# Patient Record
Sex: FEMALE | Race: WHITE | NOT HISPANIC OR LATINO | URBAN - METROPOLITAN AREA
[De-identification: names, ages, dates, MRNs, and addresses within clinical notes are randomized per-mention and may not be internally consistent; named-entity substitution may affect disease eponyms.]

---

## 2023-05-11 ENCOUNTER — OFFICE VISIT (OUTPATIENT)
Dept: OBGYN CLINIC | Facility: CLINIC | Age: 28
End: 2023-05-11

## 2023-05-11 VITALS — DIASTOLIC BLOOD PRESSURE: 60 MMHG | SYSTOLIC BLOOD PRESSURE: 120 MMHG | WEIGHT: 160.6 LBS

## 2023-05-11 DIAGNOSIS — Z3A.20 20 WEEKS GESTATION OF PREGNANCY: ICD-10-CM

## 2023-05-11 DIAGNOSIS — Z34.82 PRENATAL CARE, SUBSEQUENT PREGNANCY, SECOND TRIMESTER: Primary | ICD-10-CM

## 2023-05-11 NOTE — ASSESSMENT & PLAN NOTE
Nan Carmona is a transfer of care from [employer]  Moved to [place] (with family), but ultimately plans to locate to the [facility]  Records scanned  EDC based on first trimester US, changed from LMP  PN panel complete  Pap and cultures up to date  AFP low risk  Declined NIPT and carrier screening  Has not yet had level II US - referral provided  Declined flu/COVID vaccines  A positive - will need Abo/RH with Vani Magi, but can wait until third trimester labs  Starting to feel some flutters  No bleeding/LOF/cramping  Works as

## 2023-05-12 ENCOUNTER — INITIAL PRENATAL (OUTPATIENT)
Dept: OBGYN CLINIC | Facility: CLINIC | Age: 28
End: 2023-05-12

## 2023-05-12 VITALS — BODY MASS INDEX: 28.45 KG/M2 | HEIGHT: 63 IN

## 2023-05-12 DIAGNOSIS — Z34.02 ENCOUNTER FOR SUPERVISION OF NORMAL FIRST PREGNANCY IN SECOND TRIMESTER: Primary | ICD-10-CM

## 2023-05-12 RX ORDER — MAGNESIUM 200 MG
TABLET ORAL
COMMUNITY

## 2023-05-12 RX ORDER — DIPHENHYDRAMINE HCL 25 MG
25 TABLET ORAL EVERY 6 HOURS PRN
COMMUNITY

## 2023-05-12 RX ORDER — LORATADINE 10 MG/1
10 TABLET ORAL DAILY
COMMUNITY

## 2023-05-12 NOTE — PATIENT INSTRUCTIONS
Congratulations!! Please review our Pregnancy Essential Guide and [employer] Virtual tour from our [employer]  [facility] Pregnancy Essentials Guide  [facility] ([address] [facility])     [address] [facility] ([facility])

## 2023-05-12 NOTE — PROGRESS NOTES
OB INTAKE INTERVIEW  Patient is 32 y o y o  who presents for OB intake at 20wks  She is accompanied by none during this encounter  The father of her baby Joan Griffin is involved in the pregnancy and is 36years old    Last Menstrual Period: 2022  Ultrasound: Measured 11 weeks 2 days on  Done in CT  Estimated Date of Delivery: 23 CHANGED 11 week US    Signs/Symptoms of Pregnancy  Current pregnancy symptoms: none feeling well  Constipation no  Headaches no  Cramping/spotting no  PICA cravings no    Diabetes-  Body mass index is 28 45 kg/m²  If patient has 1 or more, please order early 1 hour GTT  History of GDM no  BMI >35 no  History of PCOS or current metformin use no  History of LGA/macrosomic infant (4000g/9lbs) no    If patient has 2 or more, please order early 1 hour GTT  BMI>30 no  AMA no  First degree relative with type 2 diabetes no  History of chronic HTN, hyperlipidemia, elevated A1C no  High risk race (, , ,  or ) no    Hypertension- if you answer yes, please order preeclampsia labs (cbc, comprehensive metabolic panel, urine protein creatinine ratio, uric acid)  History of of chronic HTN no  History of gestational HTN no  History of preeclampsia, eclampsia, or HELLP syndrome no  History of diabetes no  History of lupus, autoimmune disease, kidney disease no    Thyroid- if yes order TSH with reflex T4  History of thyroid disease no    Bleeding Disorder or Hx of DVT-patient or first degree relative with history of  Order the following if not done previously     (Factor V, antithrombin III, prothrombin gene mutation, protein C and S Ag, lupus anticoagulant, anticardiolipin, beta-2 glycoprotein)   no    OB/GYN-  History of abnormal pap smear no       Date of last pap smear 2021  History of HPV no  History of Herpes/HSV no  History of other STI (gonorrhea, chlamydia, trich) no  History of prior  no  History of prior  no  History of  delivery prior to 36 weeks 6 days no  History of blood transfusion no  Ok for blood transfusion yes    Substance screening- if yes outside of tobacco for her or anyone in her home-order urine drug screen  History of tobacco use YES  Currently using tobacco no  Currently using alcohol no  Presently using drugs no  Past drug use  no  IV drug use- no  Partner drug use no  Parent/Family drug use no    MRSA Screening-   Does the pt have a hx of MRSA? no  If yes- please follow MRSA protocol and obtain a nasal swab for MRSA culture    Immunizations:  Influenza vaccine given this season no, never had one  Discussed Tdap vaccine yes  Discussed COVID Vaccine no, declined    Genetic/MFM-  Do you or your partner have a history of any of the following in yourselves or first degree relatives? Cystic fibrosis no  Spinal muscular atrophy no  Hemoglobinopathy/Sickle Cell/Thalassemia no  Fragile X Intellectual Disability no    If yes, discuss carrier screening and recommend consultation with [facility]/genetic counseling  If no, discuss option for carrier screening and/or genetic testing with Nuchal Ultrasound  Patient interested Transfer from [place]- done prior at 12 weeks see records  Appointment at [facility] made yes 5/15    Interview education  [facility] Pregnancy Essentials Book reviewed, discussed and attached to their AVS yes    Nurse/Family Partnership- patient may qualify no; referral placed no    Prenatal lab work scripts no, will do 28 week labs PN1 labs done in [email or web address]    Extra labs ordered:  none    Aspirin/Preeclampsia Screen    Risk Level Risk Factor Recommendation   LOW Prior Uncomplicated full-term delivery no No Aspirin recommendation        MODERATE Nulliparity YES Recommend low-dose aspirin if     BMI>30 no 2 or more moderate risk factors    Family History Preeclampsia (mother/sister) no     35yr old or greater no      or Low Socioeconomic no     IVF Pregnancy  no     Personal History Risks (low birth weight, prior adverse preg outcome, >10yr preg interval) no         HIGH History of Preeclampsia no Recommend low-dose aspirin if     Multifetal gestation no 1 or more high risk factors    Chronic HTN no     Type 1 or 2 Diabetes no     Renal Disease no     Autoimmune Disease  no      Contraindications to ASA therapy:  NSAID/ ASA allergy: no  Nasal polyps: no  Asthma with history of ASA induced bronchospasm: no  Relative contraindications:  History of GI bleed: no  Active peptic ulcer disease: no  Severe hepatic dysfunction: no    Patient should be recommended to take ASA 162mg during this pregnancy from 12-36wks to lower her risk of preeclampsia: no      The patient has a history now or in prior pregnancy notable for:  none      Details that I feel the provider should be aware of: This is a planned pregnancy for Cary and her  Rishi Ashraf  They were just recently  about a week ago, and transferred from [employer] to [facility] for [employer] job  Shalom Perez has family in the area and grew up in [place]  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 will deliver at [address] [place] [address], and as the time gets closer based on where they live will determine which practice they follow with  Family history is complicated on FOB side, his oldest brother was born with Dextrocardia, with multiple heart surgeries, and passed away at 28years old after his heart transplant and complications  Rishi Ashraf and his oldest son (18yo) have no known cardiac issues  PN1 visit scheduled   The patient was oriented to our practice, reviewed delivering physicians and [facility] for Delivery  All questions were answered      Interviewed by: Sadaf Storey RN

## 2023-05-15 ENCOUNTER — ROUTINE PRENATAL (OUTPATIENT)
Dept: PERINATAL CARE | Facility: OTHER | Age: 28
End: 2023-05-15

## 2023-05-15 VITALS
BODY MASS INDEX: 28.6 KG/M2 | HEIGHT: 63 IN | DIASTOLIC BLOOD PRESSURE: 70 MMHG | SYSTOLIC BLOOD PRESSURE: 132 MMHG | HEART RATE: 81 BPM | WEIGHT: 161.4 LBS

## 2023-05-15 DIAGNOSIS — Z34.82 PRENATAL CARE, SUBSEQUENT PREGNANCY, SECOND TRIMESTER: ICD-10-CM

## 2023-05-15 DIAGNOSIS — Z36.86 ENCOUNTER FOR ANTENATAL SCREENING FOR CERVICAL LENGTH: ICD-10-CM

## 2023-05-15 DIAGNOSIS — Z3A.21 21 WEEKS GESTATION OF PREGNANCY: ICD-10-CM

## 2023-05-15 DIAGNOSIS — Z36.3 ENCOUNTER FOR ANTENATAL SCREENING FOR MALFORMATION: Primary | ICD-10-CM

## 2023-05-15 NOTE — LETTER
"May 15, 2023     [address] [place] [address]    Patient: Jose M Aaron   YOB: 1995   Date of Visit: 5/15/2023       Dear Dr Maya Appl: Thank you for referring Jose M Aaron to me for evaluation  Below are my notes for this consultation  If you have questions, please do not hesitate to call me  I look forward to following your patient along with you  Sincerely,        Diane Adams MD        CC: No Recipients  Diane Adams MD  5/15/2023  1:00 PM  Sign when Signing Visit  [address]: Ms Nancie Goldberg was seen today for anatomic survey and cervical length screening ultrasound  See ultrasound report under \"OB Procedures\" tab  Physical Exam  Constitutional:       General: She is not in acute distress  Appearance: Normal appearance  HENT:      Head: Normocephalic and atraumatic  Eyes:      Extraocular Movements: Extraocular movements intact  Cardiovascular:      Rate and Rhythm: Normal rate  Pulmonary:      Effort: Pulmonary effort is normal  No respiratory distress  Skin:     Findings: No erythema or rash  Neurological:      Mental Status: She is alert and oriented to person, place, and time  Psychiatric:         Mood and Affect: Mood normal          Behavior: Behavior normal          Please don't hesitate to contact our office with any concerns or questions    -Diane Adams MD          "

## 2023-05-15 NOTE — PROGRESS NOTES
Ultrasound Probe Disinfection    A transvaginal ultrasound was performed  Prior to use, disinfection was performed with High Level Disinfection Process (Trophon)  Probe serial number [id number]: [id number]  was used        Sagrario Ward  05/15/23  10:01 AM

## 2023-05-15 NOTE — PROGRESS NOTES
"[address]: Ms Sepideh Flores was seen today for anatomic survey and cervical length screening ultrasound  See ultrasound report under \"OB Procedures\" tab  Physical Exam  Constitutional:       General: She is not in acute distress  Appearance: Normal appearance  HENT:      Head: Normocephalic and atraumatic  Eyes:      Extraocular Movements: Extraocular movements intact  Cardiovascular:      Rate and Rhythm: Normal rate  Pulmonary:      Effort: Pulmonary effort is normal  No respiratory distress  Skin:     Findings: No erythema or rash  Neurological:      Mental Status: She is alert and oriented to person, place, and time  Psychiatric:         Mood and Affect: Mood normal          Behavior: Behavior normal          Please don't hesitate to contact our office with any concerns or questions    -Abdirahman Wiley MD      "

## 2023-12-19 ENCOUNTER — ANNUAL EXAM (OUTPATIENT)
Age: 28
End: 2023-12-19
Payer: COMMERCIAL

## 2023-12-19 VITALS
DIASTOLIC BLOOD PRESSURE: 68 MMHG | WEIGHT: 166 LBS | HEIGHT: 63 IN | SYSTOLIC BLOOD PRESSURE: 122 MMHG | BODY MASS INDEX: 29.41 KG/M2

## 2023-12-19 DIAGNOSIS — Z30.015 ENCOUNTER FOR INITIAL PRESCRIPTION OF VAGINAL RING HORMONAL CONTRACEPTIVE: ICD-10-CM

## 2023-12-19 DIAGNOSIS — Z01.419 ROUTINE GYNECOLOGICAL EXAMINATION: Primary | ICD-10-CM

## 2023-12-19 PROBLEM — Z3A.39 39 WEEKS GESTATION OF PREGNANCY: Status: RESOLVED | Noted: 2023-05-11 | Resolved: 2023-12-19

## 2023-12-19 PROCEDURE — 99395 PREV VISIT EST AGE 18-39: CPT | Performed by: OBSTETRICS & GYNECOLOGY

## 2023-12-19 RX ORDER — ETONOGESTREL AND ETHINYL ESTRADIOL VAGINAL RING .015; .12 MG/D; MG/D
RING VAGINAL
Qty: 3 EACH | Refills: 3 | Status: SHIPPED | OUTPATIENT
Start: 2023-12-19

## 2023-12-19 NOTE — PROGRESS NOTES
Cary Keys  1995      CC:  Yearly exam    S:  28 y.o. female here for yearly exam. Has been noticing some mood changes related to her Nuvaring and cycles.     She denies vaginal discharge, itching, pelvic pain.   She has no urinary concerns, does not have incontinence.  No bowel concerns.  No breast concerns.     Sexual activity: She is sexually active without pain, bleeding or dryness.   She is  and monogamous.   She is not interested in STD screening today.     Contraception: She uses nuvaring for contraception.     Last Pap: 2021 - NILM   She has had gardasil.     We reviewed ASCCP guidelines for Pap testing today.     Family hx of breast cancer: PGM  Family hx of ovarian cancer: no  Family hx of colon cancer: no      Current Outpatient Medications:     diphenhydrAMINE (BENADRYL) 25 mg tablet, Take 25 mg by mouth every 6 (six) hours as needed for itching, Disp: , Rfl:     etonogestrel-ethinyl estradiol (NuvaRing) 0.12-0.015 MG/24HR vaginal ring, Insert vaginally and leave in place for 3 consecutive weeks, then remove for 1 week., Disp: 3 each, Rfl: 3    loratadine (CLARITIN) 10 mg tablet, Take 10 mg by mouth daily, Disp: , Rfl:     Prenatal MV-Min-Fe Fum-FA-DHA (PRENATAL+DHA PO), Take by mouth, Disp: , Rfl:   Patient Active Problem List   Diagnosis    39 weeks gestation of pregnancy    Encounter for supervision of normal first pregnancy, first trimester    Delivery by  section for breech presentation     Past Medical History:   Diagnosis Date    Dense breast tissue 2019     Family History   Problem Relation Age of Onset    No Known Problems Mother     Hypertension Father     No Known Problems Brother     No Known Problems Maternal Grandmother     Heart disease Maternal Grandfather         after covid    Diabetes Maternal Grandfather     Breast cancer Paternal Grandmother     Heart attack Paternal Grandfather           Review of Systems   Respiratory: Negative.    Cardiovascular:  "Negative.    Gastrointestinal: Negative for constipation and diarrhea.     O:  Blood pressure 122/68, height 5' 3\" (1.6 m), weight 75.3 kg (166 lb), last menstrual period 12/10/2023, not currently breastfeeding.    Patient appears well and is not in distress  Breasts are symmetrical without mass, tenderness, nipple discharge, skin changes or adenopathy.   Abdomen is soft and nontender without masses.   External genitals are normal without lesions or rashes.  Urethral meatus and urethra are normal  Bladder is normal to palpation  Vagina is normal without discharge or bleeding.   Cervix is normal without discharge or lesion.   Uterus is normal, mobile, nontender without palpable mass.  Adnexa are normal, nontender, without palpable mass.     A:  Yearly exam.     P:   Pap & HPV 2024   Mammo age 38   Nuvaring refilled, will try continuous use.    Baby & Me counseling discussed, referral placed      RTO one year for yearly exam or sooner as needed.      "

## 2024-09-03 ENCOUNTER — ULTRASOUND (OUTPATIENT)
Age: 29
End: 2024-09-03
Payer: COMMERCIAL

## 2024-09-03 VITALS
DIASTOLIC BLOOD PRESSURE: 72 MMHG | SYSTOLIC BLOOD PRESSURE: 122 MMHG | BODY MASS INDEX: 28.81 KG/M2 | WEIGHT: 162.6 LBS | HEIGHT: 63 IN

## 2024-09-03 DIAGNOSIS — N91.2 AMENORRHEA: Primary | ICD-10-CM

## 2024-09-03 PROCEDURE — 76817 TRANSVAGINAL US OBSTETRIC: CPT | Performed by: OBSTETRICS & GYNECOLOGY

## 2024-09-03 PROCEDURE — 99213 OFFICE O/P EST LOW 20 MIN: CPT | Performed by: OBSTETRICS & GYNECOLOGY

## 2024-09-03 NOTE — PROGRESS NOTES
Ultrasound Probe Disinfection    A transvaginal ultrasound was performed.   Prior to use, disinfection was performed with [employer] Disinfection Process  Probe serial number  was used    Marichuy Aquino MA  09/03/24  9:36 AM

## 2024-09-03 NOTE — PROGRESS NOTES
"Ambulatory Visit  Name: Cary Keys      : 1995      MRN: [id number]  Encounter Provider: Agnieszka Roth MD  Encounter Date: 9/3/2024   Encounter department: [facility] [place]    Assessment & Plan   1. Amenorrhea  -     Ambulatory Referral to Maternal Fetal Medicine; Future; Expected date: 2024  -     [facility] < 14 weeks single or first gestation level 1      History of Present Illness     Cary Keys is a 29 y.o. female who presents for early pregnancy ultrasound.       Planned pregnancy.  Prior  x 1, repeat  is planned.   Some mild nausea/vomiting.   No bleeding.     Review of Systems    Objective     /72 (BP Location: Left arm, Patient Position: Sitting, Cuff Size: Standard)   Ht 5' 3\" (1.6 m)   Wt 73.8 kg (162 lb 9.6 oz)   LMP 2024 (Exact Date)   BMI 28.80 kg/m²     Physical Exam  Vitals and nursing note reviewed.   Constitutional:       Appearance: Normal appearance.   Genitourinary:     General: Normal vulva.      Vagina: No vaginal discharge.   Neurological:      Mental Status: She is alert and oriented to person, place, and time.   Psychiatric:         Mood and Affect: Mood normal.         Behavior: Behavior normal.       Administrative Statements           "

## 2024-09-04 ENCOUNTER — TELEPHONE (OUTPATIENT)
Age: 29
End: 2024-09-04

## 2024-09-05 NOTE — PATIENT INSTRUCTIONS
Congratulations on your pregnancy!  We thank you for allowing us to participate in your care.    NEXT STEPS    Go to the lab to have your prenatal bloodwork competed if you have not already done so.  There is a listing of [facility] and locations in your prenatal folder. You may also visit [email or web address] or call [phone].   Please be aware that some insurance companies may require you to go to a specific lab (ex. [facility] or [facility]). You can verify this by contacting your insurance company.   If you have decided to be screened for CF and SMA genetic testing, these tests require prior authorization and scheduling.  Prior Authorization is not a guarantee of payment. There may be out of pocket expenses that includes copays, deductibles and or coinsurance for your individual plan.  Please call [phone] if our team has not contacted you in 7 business days.  Please have your blood work completed prior to you next prenatal visit.    If you have decided to have genetic testing done at Maternal Fetal Medicine, that will be scheduled by [facility]. You may have already scheduled this appointment.  If not, please call their office to schedule this appointment.  Based on the referral placed by our office, they will know how to schedule you appropriately.    Contact information for Maternal Fetal Medicine is located in your prenatal folder. The main phone number to their office is [phone]..     Return to our office for your first routine prenatal visit.     Warning Signs During Pregnancy - If you experience any problems or concerns, call the office directly.  The list below includes warning signs your providers would like you to be aware of.  If you experience any of these at any time during your pregnancy, please call us as soon as possible.    Vaginal bleeding   Sharp abdominal pain that does not go away   Fever (more than 100.4?F and is not relieved with Tylenol)   Persistent vomiting lasting greater than 24  hours   Chest pain/Shortness of breath   Pain or burning when you urinate     Call the OFFICE [phone] for any questions/emergencies.  At night or on the weekend, calls go through a triage service, please indicate it is an emergency and the DOCTOR on call will be paged.    Remember to only use MyChart for non-urgent concerns or questions.    Our doctors deliver at [facility] in [place]. The address is provided below.     [facility]  [address] [place]  [address]     Please click on the links below to review our Pregnancy Essential Guide.    [facility] Pregnancy Essentials Guide  [facility] ([email or web address])     [facility] - Virtual Tour ([email or web address])      Click on the link below to review [facility] Lab locations.  [facility] Lab Locations    [employer] resource  Sling is a tool to connect you to community resources you may need.      Thank you,   Lisa ROBERTS, RN  OB Nurse Navigator

## 2024-09-05 NOTE — PROGRESS NOTES
The patient was identified by name and date of birth and was informed that this is a virtual visit being conducted through a secure, HIPAA-complaint platform. I am in a private office space with the door closed. Patient acknowledged understanding of privacy.  She agrees to proceed and is aware that she may discontinue the visit at any time.     OB INTAKE INTERVIEW 2024    Patient is 29 y.o. who presents for OB intake at 11w5d.  She is not accompanied by anyone during this encounter.  The father of her baby (Jeffry Keys) is involved in the pregnancy.      Patient's last menstrual period was 2024 (exact date).  Ultrasound: Measured 11 weeks 4 days on 9/3/2024  Estimated Date of Delivery: 3/23/25 confirmed by dating ultrasound.    Signs/Symptoms of Pregnancy  Current pregnancy symptoms: fatigue and frequent urination  Headaches: no  Cramping: no  Spotting: no  PICA cravings: no    Diabetes:  Virtual OB intake - pre gravid BMI 28  If patient has 1 or more, please order early 1 hour GTT  History of GDM: no  BMI >35 no  History of PCOS or current metformin use: no  History of LGA/macrosomic infant (4000g/9lbs): no    If patient has 2 or more, please order early 1 hour GTT  BMI>30 no  AMA: no  First degree relative with type 2 diabetes: no  History of chronic HTN, hyperlipidemia, elevated A1C: no  High risk race (, , ,  or ): no    Hypertension: if you answer yes to any of the following, please order baseline preeclampsia labs (cbc, comprehensive metabolic panel, urine protein creatinine ratio, uric acid)  History of of chronic HTN: no  History of gestational HTN: no  History of preeclampsia, eclampsia, or HELLP syndrome: no  History of diabetes: no  History of lupus, autoimmune disease, kidney disease: no    Thyroid: if yes order TSH with reflex T4  History of thyroid disease: no    Bleeding Disorder or Hx of DVT - patient or first degree  relative with history of. Order the following if not done previously.   (Factor V, antithrombin III, prothrombin gene mutation, protein C and S Ag, lupus anticoagulant, anticardiolipin, beta-2 glycoprotein):   no    OB/GYN:  History of abnormal pap smear: no       Date of last pap smear: has been many years  History of HPV: no  History of Herpes/HSV: no  History of other STI: (gonorrhea, chlamydia, trich) no  History of prior : no  History of prior : YES - breech/failed ECV.  Desires elective repeat C/S  History of  delivery prior to 36 weeks 6 days: no  History of blood transfusion: no  Ok for blood transfusion: YES    Substance screening:   History of tobacco use: no  Currently using tobacco: no  Currently using alcohol: no  Presently using drugs: no  Past drug use:  no  IV drug use: no  Partner drug use: no  Parent/Family drug use: no  Substance Use Screen: Level  no risk    MRSA Screening:   Does the pt have a hx of MRSA? no    Mental Health:  Hx of/or current dx of depression: no  Hx of/or current dx of anxiety: YES - Post Partum  Medications: no   EPDS Screen:  Negative / score: 2    Immunizations:  Discussed Tdap vaccine:  YES  Discussed COVID Vaccine:  YES - declined in the past    Genetic/MFM:  Do you or your partner have a history of any of the following in yourselves or first degree relatives?  Cystic fibrosis: no  Spinal muscular atrophy: no  Hemoglobinopathy/Sickle Cell/Thalassemia: no  Fragile X Intellectual Disability: no    Discussed Carrier Screening being completed once in a lifetime as a standard of care lab test. Place orders for Cystic Fibrosis Gene Test ([id number]) and Spinal Muscular Atrophy DNA ([id number]).  Patient was informed that prior authorization needs to be completed for these tests and this may take 7-10 business days.  Patient does desire testing for Cystic Fibrosis and Spinal Muscular Atrophy.    [facility] Patient Education Cystic Fibrosis and Spinal Muscular Atrophy  prenatal screening given.    Appointment for Nuchal Translucency Ultrasound at [facility] is scheduled for 9/17/24.    Interview education:  [facility] Pregnancy Essentials Book reviewed, discussed and attached to their AVS: YES     Nurse/Family Partnership-patient may qualify NO; referral placed NO     Prenatal lab work scripts: YES    Extra labs ordered: Cystic Fibrosis gene test, Spinal muscular atrophy DNA, and Hgb Fractionation Cascade    Aspirin/Preeclampsia Screen    Risk Level Risk Factor Recommendation   LOW Prior Uncomplicated full-term delivery: YES No Aspirin recommendation        MODERATE Nulliparity no Recommend low-dose aspirin if     BMI>30 no 2 or more moderate risk factors    Family History Preeclampsia (mother/sister) no     35yr old or greater: no      or Low Socioeconomic: no     IVF Pregnancy:  no     Personal History Risks (low birth weight, prior adverse preg outcome, >10yr preg interval): no         HIGH History of Preeclampsia: no Recommend low-dose aspirin if     Multifetal gestation: no 1 or more high risk factors    Chronic HTN: no     Type 1 or 2 Diabetes: no     Renal Disease: no     Autoimmune Disease:  no      Contraindications to ASA therapy:  NSAID/ ASA allergy: no  Nasal polyps: no  Asthma with history of ASA induced bronchospasm: no    Relative contraindications:  History of GI bleed: no  Active peptic ulcer disease: no  Severe hepatic dysfunction: no    Patient does not meet recommendation to take ASA 162mg during this pregnancy from 12-36 wks to lower her risk of preeclampsia.      The patient has a history now or in prior pregnancy notable for: short interval pregnancy - delivered 9/21/23 by C/S, pt states that the FOB's brother had a congenital heart defect that required many surgeries as an infant-passed at age 32     Details that I feel the provider should be aware of: Cary was seen here in office for her OB Intake visit, Hx obtained, Offered no c/o at present,  although she states that she experienced post part anxiety after prior pregnancy - discussed services at Baby and Me and/or discuss medications w/provider, verbalized understanding. Reviewed medications safe to take in pregnancy. [facility] Essentials packet/link reviewed. [facility] Baby and Me classes reviewed and how to register for classes.  Pt states that she would like to have an elective repeat C/S.     PN1 visit scheduled. The patient was oriented to our practice, the navigator role, reviewed delivering physicians and [facility] for delivery. All questions were answered.    Interviewed by: Lisa Saini RN

## 2024-09-06 ENCOUNTER — INITIAL PRENATAL (OUTPATIENT)
Age: 29
End: 2024-09-06

## 2024-09-06 DIAGNOSIS — Z98.891 HISTORY OF CESAREAN DELIVERY: ICD-10-CM

## 2024-09-06 DIAGNOSIS — Z34.81 MULTIGRAVIDA IN FIRST TRIMESTER: Primary | ICD-10-CM

## 2024-09-06 DIAGNOSIS — Z31.430 ENCOUNTER OF FEMALE FOR TESTING FOR GENETIC DISEASE CARRIER STATUS FOR PROCREATIVE MANAGEMENT: ICD-10-CM

## 2024-09-06 PROCEDURE — OBC

## 2024-09-06 RX ORDER — MAGNESIUM 30 MG
30 TABLET ORAL DAILY
COMMUNITY

## 2024-09-13 ENCOUNTER — TELEPHONE (OUTPATIENT)
Age: 29
End: 2024-09-13

## 2024-09-13 NOTE — TELEPHONE ENCOUNTER
Received a call from Roni, nurse  , Concepcion stating she would like any case management needs called to [phone] Ext. [phone].  Fax number to office provided : [phone].

## 2024-09-16 PROBLEM — O09.899 SHORT INTERVAL BETWEEN PREGNANCIES AFFECTING PREGNANCY, ANTEPARTUM: Status: ACTIVE | Noted: 2024-09-16

## 2024-09-16 PROBLEM — Z34.01 ENCOUNTER FOR SUPERVISION OF NORMAL FIRST PREGNANCY, FIRST TRIMESTER: Status: RESOLVED | Noted: 2023-08-29 | Resolved: 2024-09-16

## 2024-09-16 PROBLEM — O34.219 HISTORY OF CESAREAN DELIVERY, ANTEPARTUM: Status: ACTIVE | Noted: 2024-09-16

## 2024-09-17 ENCOUNTER — ROUTINE PRENATAL (OUTPATIENT)
Dept: PERINATAL CARE | Facility: OTHER | Age: 29
End: 2024-09-17
Payer: COMMERCIAL

## 2024-09-17 ENCOUNTER — INITIAL PRENATAL (OUTPATIENT)
Age: 29
End: 2024-09-17
Payer: COMMERCIAL

## 2024-09-17 VITALS — SYSTOLIC BLOOD PRESSURE: 112 MMHG | WEIGHT: 161.2 LBS | BODY MASS INDEX: 28.56 KG/M2 | DIASTOLIC BLOOD PRESSURE: 82 MMHG

## 2024-09-17 VITALS
BODY MASS INDEX: 28.92 KG/M2 | DIASTOLIC BLOOD PRESSURE: 60 MMHG | WEIGHT: 163.2 LBS | SYSTOLIC BLOOD PRESSURE: 112 MMHG | HEART RATE: 99 BPM | HEIGHT: 63 IN

## 2024-09-17 DIAGNOSIS — Z12.4 SCREENING FOR CERVICAL CANCER: ICD-10-CM

## 2024-09-17 DIAGNOSIS — Z11.51 SCREENING FOR HUMAN PAPILLOMAVIRUS (HPV): ICD-10-CM

## 2024-09-17 DIAGNOSIS — Z3A.13 13 WEEKS GESTATION OF PREGNANCY: ICD-10-CM

## 2024-09-17 DIAGNOSIS — O34.219 HISTORY OF CESAREAN DELIVERY, ANTEPARTUM: Primary | ICD-10-CM

## 2024-09-17 DIAGNOSIS — O09.899 SHORT INTERVAL BETWEEN PREGNANCIES AFFECTING PREGNANCY, ANTEPARTUM: ICD-10-CM

## 2024-09-17 DIAGNOSIS — Z36.82 ENCOUNTER FOR (NT) NUCHAL TRANSLUCENCY SCAN: ICD-10-CM

## 2024-09-17 DIAGNOSIS — N91.2 AMENORRHEA: ICD-10-CM

## 2024-09-17 DIAGNOSIS — Z11.3 SCREENING FOR STD (SEXUALLY TRANSMITTED DISEASE): ICD-10-CM

## 2024-09-17 DIAGNOSIS — Z34.82 PRENATAL CARE, SUBSEQUENT PREGNANCY, SECOND TRIMESTER: Primary | ICD-10-CM

## 2024-09-17 LAB
EXTERNAL CHLAMYDIA SCREEN: NORMAL
EXTERNAL GONORRHEA SCREEN: NORMAL
SL AMB  POCT GLUCOSE, UA: NEGATIVE
SL AMB POCT URINE PROTEIN: NEGATIVE

## 2024-09-17 PROCEDURE — 76801 OB US < 14 WKS SINGLE FETUS: CPT | Performed by: OBSTETRICS & GYNECOLOGY

## 2024-09-17 PROCEDURE — 99213 OFFICE O/P EST LOW 20 MIN: CPT | Performed by: NURSE PRACTITIONER

## 2024-09-17 PROCEDURE — 81002 URINALYSIS NONAUTO W/O SCOPE: CPT | Performed by: OBSTETRICS & GYNECOLOGY

## 2024-09-17 PROCEDURE — PNV: Performed by: OBSTETRICS & GYNECOLOGY

## 2024-09-17 PROCEDURE — 76813 OB US NUCHAL MEAS 1 GEST: CPT | Performed by: OBSTETRICS & GYNECOLOGY

## 2024-09-17 NOTE — PROGRESS NOTES
Cary presents for routine PN-1 visit.   Labs completed at [facility] will work on obtaining.    Pap and cultures obtained.   Plans RLTCS.   Blue folder given.   NT scan is scheduled today.

## 2024-09-17 NOTE — PROGRESS NOTES
OFFICE CONSULT      Dear Dr. Lin,    Thank you for requesting a  consultation on your patient Cary Keys for the following indications:  Genetic screening    History  Medications: Prenatal vitamins, Claritin, magnesium and Benadryl  Allergies to medications: No known drug allergies  Past medical history: Noncontributory  Past surgical history:   Past obstetrical history: .  In 2023 she had a term  delivery (breech with failed version attempt) of a male  weighing 7 pounds 5 ounces.  She denies pregnancy complications.  She has a history of 1 elective termination.  Social history: Denies current use of alcohol, tobacco or drugs of abuse.  First generation family history: Hypertension in her father.    Ultrasound findings: The ultrasound shows a fetus concordant with dates. The nasal bone and nuchal translucency appear normal. No malformations are seen on today's early ultrasound.       She does not report any vaginal bleeding or uterine cramping or contractions.      Specific counseling was provided on the following problems:  1. We discussed the options for genetic screening which include invasive testing on the fetal placenta or on fetal skin cells within the amniotic fluid and compared this to noninvasive testing which includes cell free DNA screening and the sequential screen.  We reviewed the risks, the benefits and the limitations of each.  In the end patient declined genetic screening.     2.  We discussed her history of prior  section.  Assessment of placental location is indicated at the time of anatomy scan to assess for risk of placenta accreta spectrum (PAS), as PAS is a risk of prior .  She is planning a repeat .     3.  Short interval between pregnancies (< 12 months from end of one pregnancy to beginning of next)  is associated with increased risk for  birth and low birthweight. A third trimester growth  ultrasound is recommended.       4.  We reviewed current recommendations regarding  Flu, COVID and RSV (third trimester) vaccines by the American College of Obstetricians and Gynecologists and the Society for Maternal-Fetal Medicine. We discussed reassuring pregnancy outcome information after vaccination. We discussed the increased severity of infections and the resultant maternal and fetal complications that can arise with a severe infection including  labor.  Vaccines have been found to generate  antibodies in pregnant and lactating women similar to that observed in non-pregnant women. Vaccine-induced antibody levels were significantly greater than the levels found in response to natural infection. Immune transfer of these antibodies to neonates is found to occur via the placenta and breast milk.     Future tests recommended:  The results of her NIPT will return in 7-10 days and her OB office will order an MSAFP screen at 16-18 weeks to screen for spina bifida.       Future ultrasounds ordered today:   Fetal Level II ultrasound imaging is recommended at 19-20 weeks' gestation.      Split-shared decision-making between MARIIA Whittington and myself was utilized, with the majority of the time spent by FRANK Whittington.  Medical decision-making for this encounter was moderate (diagnosis moderate, data moderate and risk moderate).    I reviewed the ultrasound pictures and recommended the medical decision making transcribed in the care of this patient.      Amrita Kerns M.D.

## 2024-09-17 NOTE — LETTER
2024     Maria Fernanda Lin MD  [address] [facility] [address]    Patient: Cary Keys   YOB: 1995   Date of Visit: 2024       Dear Dr. Lin:    Thank you for referring Cary Keys to me for evaluation. Below are my notes for this consultation.    If you have questions, please do not hesitate to call me. I look forward to following your patient along with you.         Sincerely,        Amrita Kerns MD        CC: No Recipients    Amrita Kerns MD  2024  6:23 PM  Sign when Signing Visit  OFFICE CONSULT      Dear Dr. Lin,    Thank you for requesting a  consultation on your patient Cary Keys for the following indications:  Genetic screening    History  Medications: Prenatal vitamins, Claritin, magnesium and Benadryl  Allergies to medications: No known drug allergies  Past medical history: Noncontributory  Past surgical history:   Past obstetrical history: .  In 2023 she had a term  delivery (breech with failed version attempt) of a male  weighing 7 pounds 5 ounces.  She denies pregnancy complications.  She has a history of 1 elective termination.  Social history: Denies current use of alcohol, tobacco or drugs of abuse.  First generation family history: Hypertension in her father.    Ultrasound findings: The ultrasound shows a fetus concordant with dates. The nasal bone and nuchal translucency appear normal. No malformations are seen on today's early ultrasound.       She does not report any vaginal bleeding or uterine cramping or contractions.      Specific counseling was provided on the following problems:  1. We discussed the options for genetic screening which include invasive testing on the fetal placenta or on fetal skin cells within the amniotic fluid and compared this to noninvasive testing which includes cell free DNA screening and the sequential screen.  We reviewed  the risks, the benefits and the limitations of each.  In the end patient declined genetic screening.     2.  We discussed her history of prior  section.  Assessment of placental location is indicated at the time of anatomy scan to assess for risk of placenta accreta spectrum (PAS), as PAS is a risk of prior .  She is planning a repeat .     3.  Short interval between pregnancies (< 12 months from end of one pregnancy to beginning of next)  is associated with increased risk for  birth and low birthweight. A third trimester growth ultrasound is recommended.       4.  We reviewed current recommendations regarding  Flu, COVID and RSV (third trimester) vaccines by the American College of Obstetricians and Gynecologists and the Society for Maternal-Fetal Medicine. We discussed reassuring pregnancy outcome information after vaccination. We discussed the increased severity of infections and the resultant maternal and fetal complications that can arise with a severe infection including  labor.  Vaccines have been found to generate  antibodies in pregnant and lactating women similar to that observed in non-pregnant women. Vaccine-induced antibody levels were significantly greater than the levels found in response to natural infection. Immune transfer of these antibodies to neonates is found to occur via the placenta and breast milk.     Future tests recommended:  The results of her NIPT will return in 7-10 days and her OB office will order an MSAFP screen at 16-18 weeks to screen for spina bifida.       Future ultrasounds ordered today:   Fetal Level II ultrasound imaging is recommended at 19-20 weeks' gestation.      Split-shared decision-making between MARIIA Whittington and myself was utilized, with the majority of the time spent by FRANK Whittington.  Medical decision-making for this encounter was moderate (diagnosis moderate, data moderate and risk moderate).    I reviewed the ultrasound  pictures and recommended the medical decision making transcribed in the care of this patient.      Amrita Kerns M.D.

## 2024-09-17 NOTE — PROGRESS NOTES
Patient here for first OB visit today  13w2d  [id number]  Last pap: 6/2/21 NILM  Last annual exam: 12/19/23  PN1 Labs not completed    Previous C/S births: YES - breech/failed ECV.  Desires elective repeat C/S      GC/CH collected today  Blue folder given today

## 2024-09-23 ENCOUNTER — PATIENT MESSAGE (OUTPATIENT)
Age: 29
End: 2024-09-23

## 2024-10-01 LAB
EXTERNAL ABO GROUPING: NORMAL
EXTERNAL HEMATOCRIT: 42.2 %
EXTERNAL HEMOGLOBIN: 14.2 G/DL
EXTERNAL HEPATITIS B SURFACE ANTIGEN: NORMAL
EXTERNAL HIV-1/2 AB-AG: NORMAL
EXTERNAL PLATELET COUNT: 240 K/ΜL
EXTERNAL RH FACTOR: POSITIVE
EXTERNAL RUBELLA IGG QUANTITATION: NORMAL
EXTERNAL SYPHILIS TOTAL IGG/IGM SCREENING: NORMAL

## 2024-10-10 ENCOUNTER — ROUTINE PRENATAL (OUTPATIENT)
Age: 29
End: 2024-10-10
Payer: COMMERCIAL

## 2024-10-10 VITALS — WEIGHT: 162.6 LBS | SYSTOLIC BLOOD PRESSURE: 118 MMHG | BODY MASS INDEX: 28.8 KG/M2 | DIASTOLIC BLOOD PRESSURE: 82 MMHG

## 2024-10-10 DIAGNOSIS — Z36.9 ENCOUNTER FOR ANTENATAL SCREENING: ICD-10-CM

## 2024-10-10 DIAGNOSIS — Z34.82 PRENATAL CARE, SUBSEQUENT PREGNANCY, SECOND TRIMESTER: Primary | ICD-10-CM

## 2024-10-10 DIAGNOSIS — Z33.1 INCIDENTAL PREGNANCY: ICD-10-CM

## 2024-10-10 LAB
SL AMB  POCT GLUCOSE, UA: NORMAL
SL AMB POCT URINE PROTEIN: NORMAL

## 2024-10-10 PROCEDURE — PNV: Performed by: OBSTETRICS & GYNECOLOGY

## 2024-10-10 PROCEDURE — 81002 URINALYSIS NONAUTO W/O SCOPE: CPT | Performed by: OBSTETRICS & GYNECOLOGY

## 2024-10-10 NOTE — PROGRESS NOTES
Pt reports flutters/quickening this past week.  Denies any LOF, VB, or cramping.     For aime u/s 11/12/24    For MSaFP.      Does not plan to breast feed.     For repeat c/s with Dr. Wallace.     PTL, wt gain, diet reviewed.

## 2024-10-10 NOTE — PROGRESS NOTES
PN visit  16w/4d  She denies complaints  She is started to feel movement  PN labs completed  NT scan 9/17/24  Level II US scheduled 11/12/24  Order for AFP screen placed  Flu vaccine offered; she declines

## 2024-10-19 LAB
2ND TRIMESTER 4 SCREEN SERPL-IMP: NORMAL
AFP ADJ MOM SERPL: 1.4
AFP INTERP AMN-IMP: NORMAL
AFP INTERP SERPL-IMP: NORMAL
AFP INTERP SERPL-IMP: NORMAL
AFP SERPL-MCNC: 46.2 NG/ML
AGE AT DELIVERY: 31.5 YR
GA METHOD: NORMAL
GA: 16.4 WEEKS
IDDM PATIENT QL: NORMAL
MULTIPLE PREGNANCY: NORMAL
NEURAL TUBE DEFECT RISK FETUS: 7402 %

## 2024-10-23 ENCOUNTER — TELEPHONE (OUTPATIENT)
Dept: OBGYN CLINIC | Facility: MEDICAL CENTER | Age: 29
End: 2024-10-23

## 2024-10-23 NOTE — TELEPHONE ENCOUNTER
2ND TRIMESTER CHECK-IN CALL     Overall how are you doing? Patient states she is doing well.    Compliant with routine OB care appointments? Yes    Have you completed your 1st trimester labs? Yes    If you had NIPS with MFM, do you have a order for MSAFP? Yes   Can be completed 15w-22w6d, ideally 16w-18w    Have you seen MFM and do you have your detailed US scheduled? No, scheduled 11/12/24.    Pregnancy Education-have you had a chance to review the classes offered and registered? Yes, patient is not interested in prenatal classes at this time.     Additional Notes: offers no c/o at this time

## 2024-11-06 ENCOUNTER — ROUTINE PRENATAL (OUTPATIENT)
Age: 29
End: 2024-11-06
Payer: COMMERCIAL

## 2024-11-06 VITALS — BODY MASS INDEX: 28.7 KG/M2 | DIASTOLIC BLOOD PRESSURE: 72 MMHG | SYSTOLIC BLOOD PRESSURE: 106 MMHG | WEIGHT: 162 LBS

## 2024-11-06 DIAGNOSIS — Z34.82 PRENATAL CARE, SUBSEQUENT PREGNANCY, SECOND TRIMESTER: Primary | ICD-10-CM

## 2024-11-06 LAB
2ND TRIMESTER 4 SCREEN SERPL-IMP: NORMAL
AFP ADJ MOM SERPL: 1.4
AFP INTERP AMN-IMP: NORMAL
AFP INTERP SERPL-IMP: NORMAL
AFP INTERP SERPL-IMP: NORMAL
AFP SERPL-MCNC: 46.2 NG/ML
AGE AT DELIVERY: 31.5 YR
GA METHOD: NORMAL
GA: 16.4 WEEKS
IDDM PATIENT QL: NO
MULTIPLE PREGNANCY: NO
NEURAL TUBE DEFECT RISK FETUS: 3600 %
SL AMB  POCT GLUCOSE, UA: NEGATIVE
SL AMB POCT URINE PROTEIN: NEGATIVE

## 2024-11-06 PROCEDURE — PNV: Performed by: OBSTETRICS & GYNECOLOGY

## 2024-11-06 PROCEDURE — 81002 URINALYSIS NONAUTO W/O SCOPE: CPT | Performed by: OBSTETRICS & GYNECOLOGY

## 2024-11-06 NOTE — PROGRESS NOTES
Cary presents for routine PN visit.   Recent MSAFP calculated incorrectly by [facility] - put in as twins.  This was corrected and they will be sending new results to us.  No bleeding, LOF.   Starting to feel some movement.   Level II US is next week.

## 2024-11-06 NOTE — PROGRESS NOTES
"20w3d  Level II scheduled for 11/12/24  AFP negative- but would like to review because it states \"Twin pregnancy\"  Declined Flu vaccine  +Fetal movement    EPDS: 0  "

## 2024-11-12 ENCOUNTER — ROUTINE PRENATAL (OUTPATIENT)
Dept: PERINATAL CARE | Facility: OTHER | Age: 29
End: 2024-11-12
Payer: COMMERCIAL

## 2024-11-12 VITALS
HEIGHT: 63 IN | HEART RATE: 82 BPM | SYSTOLIC BLOOD PRESSURE: 118 MMHG | BODY MASS INDEX: 29.41 KG/M2 | WEIGHT: 166 LBS | DIASTOLIC BLOOD PRESSURE: 60 MMHG

## 2024-11-12 DIAGNOSIS — O34.219 HISTORY OF CESAREAN DELIVERY, ANTEPARTUM: Primary | ICD-10-CM

## 2024-11-12 DIAGNOSIS — O09.899 SHORT INTERVAL BETWEEN PREGNANCIES AFFECTING PREGNANCY, ANTEPARTUM: ICD-10-CM

## 2024-11-12 DIAGNOSIS — Z3A.21 21 WEEKS GESTATION OF PREGNANCY: ICD-10-CM

## 2024-11-12 DIAGNOSIS — Z36.3 ENCOUNTER FOR ANTENATAL SCREENING FOR MALFORMATIONS: ICD-10-CM

## 2024-11-12 DIAGNOSIS — Z36.86 ENCOUNTER FOR ANTENATAL SCREENING FOR CERVICAL LENGTH: ICD-10-CM

## 2024-11-12 PROCEDURE — 76811 OB US DETAILED SNGL FETUS: CPT | Performed by: OBSTETRICS & GYNECOLOGY

## 2024-11-12 PROCEDURE — 76817 TRANSVAGINAL US OBSTETRIC: CPT | Performed by: OBSTETRICS & GYNECOLOGY

## 2024-11-12 PROCEDURE — 99213 OFFICE O/P EST LOW 20 MIN: CPT | Performed by: NURSE PRACTITIONER

## 2024-11-12 NOTE — LETTER
2024     Maria Fernanda Lin MD  [address] [facility] [address]    Patient: Cary Keys   YOB: 1995   Date of Visit: 2024       Dear Dr. Lin:    Thank you for referring Cary Keys to me for evaluation. Below are my notes for this consultation.    If you have questions, please do not hesitate to call me. I look forward to following your patient along with you.         Sincerely,        Amrita Kerns MD        CC: No Recipients    Amrita Krens MD  2024 10:02 AM  Sign when Signing Visit  Cary Keys  has no complaints today. She is here at 21w2d  for detailed anatomic survey. She reports fetal movements and does not report any vaginal bleeding or signs of labor.  She continues to decline genetic screening.  She had a negative MSAFP screen (MoM 1.4).     Problem list:  1. Prior  section (breech)  2. Short interval pregnancy     Ultrasound findings:  A viable amador intrauterine pregnancy is seen on today's ultrasound, measuring consistent with established EDC.  The fetal anatomic survey is complete, and no fetal anomalies are suspected.  Amniotic fluid and placenta are within normal limits. Transvaginal cervical length is within normal limits (4.20 cm), indicating low risk for  birth.     Pregnancy ultrasound has limitations and is unable to detect all forms of fetal congenital abnormalities.       Specific counseling was provided on the following problems:  1.  Her placenta is posterior without evidence of placenta accreta spectrum (PAS),   2.  She is planning a repeat  delivery.     Follow up recommended:   1.  Growth scan at 32 weeks for the indication of short interval pregnancy.       ). Split-shared decision-making between MARIIA Whittington and myself was utilized, with the majority of the time spent by FRANK Whittington.  Medical decision-making for this encounter was low (diagnosis low, data limited and risk  low).    Procedures that were completed today were charged separately.     I reviewed the ultrasound pictures and recommended the medical decision making transcribed in the care of this patient.        Amrita Kerns M.D.

## 2024-11-12 NOTE — PROGRESS NOTES
Cary Keys  has no complaints today. She is here at 21w2d  for detailed anatomic survey. She reports fetal movements and does not report any vaginal bleeding or signs of labor.  She continues to decline genetic screening.  She had a negative MSAFP screen (MoM 1.4).     Problem list:  1. Prior  section (breech)  2. Short interval pregnancy     Ultrasound findings:  A viable amador intrauterine pregnancy is seen on today's ultrasound, measuring consistent with established EDC.  The fetal anatomic survey is complete, and no fetal anomalies are suspected.  Amniotic fluid and placenta are within normal limits. Transvaginal cervical length is within normal limits (4.20 cm), indicating low risk for  birth.     Pregnancy ultrasound has limitations and is unable to detect all forms of fetal congenital abnormalities.       Specific counseling was provided on the following problems:  1.  Her placenta is posterior without evidence of placenta accreta spectrum (PAS),   2.  She is planning a repeat  delivery.     Follow up recommended:   1.  Growth scan at 32 weeks for the indication of short interval pregnancy.       ). Split-shared decision-making between MARIIA Whittington and myself was utilized, with the majority of the time spent by FRANK Whittington.  Medical decision-making for this encounter was low (diagnosis low, data limited and risk low).    Procedures that were completed today were charged separately.     I reviewed the ultrasound pictures and recommended the medical decision making transcribed in the care of this patient.        Amrita Kerns M.D.

## 2024-11-27 ENCOUNTER — RESULTS FOLLOW-UP (OUTPATIENT)
Age: 29
End: 2024-11-27

## 2024-12-05 ENCOUNTER — ROUTINE PRENATAL (OUTPATIENT)
Age: 29
End: 2024-12-05
Payer: COMMERCIAL

## 2024-12-05 VITALS — WEIGHT: 165 LBS | BODY MASS INDEX: 29.23 KG/M2 | DIASTOLIC BLOOD PRESSURE: 62 MMHG | SYSTOLIC BLOOD PRESSURE: 114 MMHG

## 2024-12-05 DIAGNOSIS — Z34.82 PRENATAL CARE, SUBSEQUENT PREGNANCY, SECOND TRIMESTER: Primary | ICD-10-CM

## 2024-12-05 DIAGNOSIS — Z11.3 SCREENING FOR STD (SEXUALLY TRANSMITTED DISEASE): ICD-10-CM

## 2024-12-05 LAB
SL AMB  POCT GLUCOSE, UA: NORMAL
SL AMB POCT URINE PROTEIN: NORMAL

## 2024-12-05 PROCEDURE — 81002 URINALYSIS NONAUTO W/O SCOPE: CPT | Performed by: OBSTETRICS & GYNECOLOGY

## 2024-12-05 PROCEDURE — PNV: Performed by: OBSTETRICS & GYNECOLOGY

## 2024-12-05 NOTE — PROGRESS NOTES
24w4d  AFP; neg   28 week labs ordered  Denies Leaking of Fluid, vaginal bleeding, contractions  + fetal movement    Having a GIRL!

## 2024-12-05 NOTE — PROGRESS NOTES
Good FM  No VB or cramping  28 week lab order given  May decline 32 week sono - plans repeat CS regardless

## 2024-12-16 LAB
EXTERNAL HEMATOCRIT: 38.8 %
EXTERNAL HEMOGLOBIN: 13.3 G/DL
EXTERNAL PLATELET COUNT: 233 K/ΜL
GLUCOSE 1H P 50 G GLC PO SERPL-MCNC: 156 MG/DL (ref 70–134)

## 2024-12-19 DIAGNOSIS — O99.810 ABNORMAL GLUCOSE AFFECTING PREGNANCY: Primary | ICD-10-CM

## 2024-12-23 LAB
EXTERNAL GTT 2 HOUR: 119
GLUCOSE 1H P GLC SERPL-MCNC: 166 MG/DL
GLUCOSE 3H P 100 G GLC PO SERPL-MCNC: 84 MG/DL
GLUCOSE P FAST SERPL-MCNC: 73 MG/DL

## 2024-12-24 ENCOUNTER — TELEPHONE (OUTPATIENT)
Age: 29
End: 2024-12-24

## 2024-12-24 NOTE — TELEPHONE ENCOUNTER
Voicemail left for patient to call office to reschedule upcoming appointment with Dr. Mcdonough on January 16, 2025.

## 2025-01-02 ENCOUNTER — ROUTINE PRENATAL (OUTPATIENT)
Age: 30
End: 2025-01-02
Payer: COMMERCIAL

## 2025-01-02 VITALS — SYSTOLIC BLOOD PRESSURE: 98 MMHG | WEIGHT: 168.6 LBS | BODY MASS INDEX: 29.87 KG/M2 | DIASTOLIC BLOOD PRESSURE: 68 MMHG

## 2025-01-02 DIAGNOSIS — Z34.82 PRENATAL CARE, SUBSEQUENT PREGNANCY, SECOND TRIMESTER: Primary | ICD-10-CM

## 2025-01-02 DIAGNOSIS — F32.A ANXIETY AND DEPRESSION: ICD-10-CM

## 2025-01-02 DIAGNOSIS — F41.9 ANXIETY AND DEPRESSION: ICD-10-CM

## 2025-01-02 LAB
SL AMB  POCT GLUCOSE, UA: NEGATIVE
SL AMB POCT URINE PROTEIN: NEGATIVE

## 2025-01-02 PROCEDURE — 81002 URINALYSIS NONAUTO W/O SCOPE: CPT | Performed by: OBSTETRICS & GYNECOLOGY

## 2025-01-02 PROCEDURE — PNV: Performed by: OBSTETRICS & GYNECOLOGY

## 2025-01-02 RX ORDER — SERTRALINE HYDROCHLORIDE 25 MG/1
25 TABLET, FILM COATED ORAL DAILY
Qty: 30 TABLET | Refills: 2 | Status: SHIPPED | OUTPATIENT
Start: 2025-01-02

## 2025-01-02 NOTE — PROGRESS NOTES
28w3d  28wk labs completed  3hr GTT completed- will send results- reports passed.  Yellow folder given today and consent signed    Denies Leaking of Fluid, vaginal bleeding, contractions  + fetal movement    Having a GIRL!

## 2025-01-02 NOTE — PROGRESS NOTES
Routine PN visit.   Passed 3 hour glucola, she will send copy to chart.   Baby is active.  No bleeding, LOF, contractions.     Starting to feel like struggling with large mood swings, other concerns.  Felt like this postpartum after Mark.   Discussed option to start a low dose Zoloft now, so things do not worsen PP.  She is in agreement.   For follow up at next visit/adjustment if needed.     Plans RLTCS.  Delivery consent signed.     Will likely cancel 32wk growth due to financial concerns, was scheduled for short interval pregnancy.  If fundal heights become a concern/etc- will consider.

## 2025-01-16 ENCOUNTER — ROUTINE PRENATAL (OUTPATIENT)
Age: 30
End: 2025-01-16
Payer: COMMERCIAL

## 2025-01-16 VITALS — DIASTOLIC BLOOD PRESSURE: 72 MMHG | WEIGHT: 169 LBS | BODY MASS INDEX: 29.94 KG/M2 | SYSTOLIC BLOOD PRESSURE: 112 MMHG

## 2025-01-16 DIAGNOSIS — Z23 NEED FOR DIPHTHERIA-TETANUS-PERTUSSIS (TDAP) VACCINE: ICD-10-CM

## 2025-01-16 DIAGNOSIS — Z34.83 PRENATAL CARE, SUBSEQUENT PREGNANCY, THIRD TRIMESTER: Primary | ICD-10-CM

## 2025-01-16 LAB
SL AMB  POCT GLUCOSE, UA: NORMAL
SL AMB POCT URINE PROTEIN: NORMAL

## 2025-01-16 PROCEDURE — 90715 TDAP VACCINE 7 YRS/> IM: CPT | Performed by: OBSTETRICS & GYNECOLOGY

## 2025-01-16 PROCEDURE — 90471 IMMUNIZATION ADMIN: CPT | Performed by: OBSTETRICS & GYNECOLOGY

## 2025-01-16 PROCEDURE — PNV: Performed by: OBSTETRICS & GYNECOLOGY

## 2025-01-16 PROCEDURE — 81002 URINALYSIS NONAUTO W/O SCOPE: CPT | Performed by: OBSTETRICS & GYNECOLOGY

## 2025-01-16 NOTE — PROGRESS NOTES
30w4d  To start low dose Zoloft   Will likely cancel 32wk growth due to financial concerns  Delivery Consent previously signed  Does not plan to breastfeed  Passed 3hr GTT  Tdap given today  Denies Leaking of Fluid, vaginal bleeding, contractions  +Fetal Movement   no hematuria/no renal colic/no flank pain L/no flank pain R

## 2025-01-20 ENCOUNTER — OB ABSTRACT (OUTPATIENT)
Age: 30
End: 2025-01-20

## 2025-01-21 ENCOUNTER — TELEPHONE (OUTPATIENT)
Dept: OBGYN CLINIC | Facility: MEDICAL CENTER | Age: 30
End: 2025-01-21

## 2025-01-21 ENCOUNTER — OB ABSTRACT (OUTPATIENT)
Dept: OBGYN CLINIC | Facility: MEDICAL CENTER | Age: 30
End: 2025-01-21

## 2025-01-21 NOTE — TELEPHONE ENCOUNTER
Attempted to contact patient for 3rd Trimester Check-in Call. Pt unavailable. [employer] msg was sent  1/20/25.  Left msg to reach out w/questions or concerns.

## 2025-01-24 ENCOUNTER — NURSE TRIAGE (OUTPATIENT)
Age: 30
End: 2025-01-24

## 2025-01-24 DIAGNOSIS — F32.A ANXIETY AND DEPRESSION: ICD-10-CM

## 2025-01-24 DIAGNOSIS — F41.9 ANXIETY AND DEPRESSION: ICD-10-CM

## 2025-01-24 RX ORDER — SERTRALINE HYDROCHLORIDE 25 MG/1
25 TABLET, FILM COATED ORAL DAILY
Qty: 90 TABLET | Refills: 1 | Status: SHIPPED | OUTPATIENT
Start: 2025-01-24

## 2025-01-28 NOTE — TELEPHONE ENCOUNTER
Attempted to contact patient for 3rd Trimester Check-in Call. Pt unavailable. [employer] msg was sent  1/20/25.  Left msg to reach out w/questions or concerns.

## 2025-01-31 ENCOUNTER — ROUTINE PRENATAL (OUTPATIENT)
Age: 30
End: 2025-01-31
Payer: COMMERCIAL

## 2025-01-31 VITALS — DIASTOLIC BLOOD PRESSURE: 82 MMHG | BODY MASS INDEX: 30.08 KG/M2 | WEIGHT: 169.8 LBS | SYSTOLIC BLOOD PRESSURE: 128 MMHG

## 2025-01-31 DIAGNOSIS — Z34.83 PRENATAL CARE, SUBSEQUENT PREGNANCY, THIRD TRIMESTER: Primary | ICD-10-CM

## 2025-01-31 LAB
SL AMB  POCT GLUCOSE, UA: NORMAL
SL AMB POCT URINE PROTEIN: NORMAL

## 2025-01-31 PROCEDURE — PNV: Performed by: OBSTETRICS & GYNECOLOGY

## 2025-01-31 PROCEDURE — 81002 URINALYSIS NONAUTO W/O SCOPE: CPT | Performed by: OBSTETRICS & GYNECOLOGY

## 2025-01-31 NOTE — PROGRESS NOTES
32w5d  To start low dose Zoloft   Canceled 32wk growth due to financial concerns  Delivery Consent previously signed  Birth Plan returned today  Does not plan to breastfeed  Passed 3hr GTT  Up to date Tdap  Outstanding lab for RPR-reprinted today  Denies vaginal bleeding, contractions  Leaking of Fluid in the morning only,  only a small amount- not filling a pad  +Fetal Movement    EPDS: 1

## 2025-02-03 ENCOUNTER — TELEPHONE (OUTPATIENT)
Dept: OBGYN CLINIC | Facility: MEDICAL CENTER | Age: 30
End: 2025-02-03

## 2025-02-03 NOTE — TELEPHONE ENCOUNTER
Attempted to contact pt to discuss scheduled C/S date/time. Pt unavailable.  Left detailed message that her C/S will be at [facility] 3/19/25 at 10am.  Instructed to contact office with any questions.      ----- Message from Maria Fernanda Lin MD sent at 2025  9:04 AM EST -----  Regarding:   Procedure to be scheduled (IOL or CS): repeat CS  JEREMY: Estimated Date of Delivery: 3/23/25  Indication for delivery: previous CS  Requested date (s) of delivery: 3/19/2025; only MV day    If requested date is unavailable, is there a date by which the pt must be delivered? 3/23/2025  Physician preference: [facility] on 3/19/2025    If IOL, anticipated method: N/A  If CS, with or without tubal: without

## 2025-02-13 ENCOUNTER — ROUTINE PRENATAL (OUTPATIENT)
Age: 30
End: 2025-02-13
Payer: COMMERCIAL

## 2025-02-13 ENCOUNTER — TELEPHONE (OUTPATIENT)
Dept: OBGYN CLINIC | Facility: CLINIC | Age: 30
End: 2025-02-13

## 2025-02-13 VITALS — WEIGHT: 169 LBS | SYSTOLIC BLOOD PRESSURE: 118 MMHG | BODY MASS INDEX: 29.94 KG/M2 | DIASTOLIC BLOOD PRESSURE: 82 MMHG

## 2025-02-13 DIAGNOSIS — Z34.83 PRENATAL CARE, SUBSEQUENT PREGNANCY, THIRD TRIMESTER: Primary | ICD-10-CM

## 2025-02-13 LAB
SL AMB  POCT GLUCOSE, UA: NORMAL
SL AMB POCT URINE PROTEIN: NORMAL

## 2025-02-13 PROCEDURE — PNV: Performed by: OBSTETRICS & GYNECOLOGY

## 2025-02-13 PROCEDURE — 81002 URINALYSIS NONAUTO W/O SCOPE: CPT | Performed by: OBSTETRICS & GYNECOLOGY

## 2025-02-13 NOTE — PROGRESS NOTES
34w4d  CS scheduled 3/19/25 with Dr. Mcdonough. Would like to discuss dates.   Up to date on Tdap  RPR is still pending. Completed at [facility]    Denies contractions or bleeding  Morning discharge. Clear, no odor  +Fetal movement

## 2025-02-13 NOTE — TELEPHONE ENCOUNTER
3RD TRIMESTER CHECK-IN CALL      Overall how are you feeling? Patient states she is doing well.    Compliant with routine OB appointments? Yes    Have you completed your 3rd trimester lab work? Yes    Have you reviewed the contents of 3rd trimester folder from office?  Yes    Have you decided on a pediatrician? Yes     If yes, who:  [facility]    Questions on paperwork to go back to office? No    Questions on the baby birth certificate forms? No    Sent link for the Hospital Readiness Video via [employer]

## 2025-02-13 NOTE — PROGRESS NOTES
Routine PN visit.     Would prefer to wait until after 3/20 for her  - discussed options, will send request to move date to 3/24 with me.   Nurse navigator messaged.  Aware of risk of laboring prior, she is accepting of this and aware would be on call doc performed .     Baby remains very active.  Initially noted for fetal tachycardia - NST performed WNL.   No bleeding, LOF, contractions.

## 2025-02-13 NOTE — TELEPHONE ENCOUNTER
C/S rescheduled to 3/24/25 at 8am, Attempted to contact pt to discuss rescheduled C/S date/time. Pt unavailable.  Left detailed message that her C/S will be at [facility] 3/24/25 at 8am.  Instructed to contact office with any questions. Left detailed msg that she will need to change PPV [facility]   ----- Message from Agnieszka Wallace MD sent at 2025 12:48 PM EST -----  Regarding: C/S  Can we move her  to Monday 3/24 at 8am with me?   I will do it at end of my call shift.    Let me know if 8am is not available.

## 2025-02-14 ENCOUNTER — TELEPHONE (OUTPATIENT)
Age: 30
End: 2025-02-14

## 2025-02-14 NOTE — TELEPHONE ENCOUNTER
Pt called regarding postpartum appt scheduled for 3/17 with Dr. Wallace, stating she is scheduled to deliver on 3/24/25.

## 2025-02-14 NOTE — TELEPHONE ENCOUNTER
Called patient and left a voicemail message that we need to reschedule two of her appointments with Dr. Ho on 2/19 and 2/25 hope to hear from her soon.

## 2025-02-21 ENCOUNTER — ROUTINE PRENATAL (OUTPATIENT)
Age: 30
End: 2025-02-21
Payer: COMMERCIAL

## 2025-02-21 VITALS — BODY MASS INDEX: 31.21 KG/M2 | SYSTOLIC BLOOD PRESSURE: 118 MMHG | DIASTOLIC BLOOD PRESSURE: 72 MMHG | WEIGHT: 176.2 LBS

## 2025-02-21 DIAGNOSIS — Z34.83 PRENATAL CARE, SUBSEQUENT PREGNANCY, THIRD TRIMESTER: Primary | ICD-10-CM

## 2025-02-21 LAB
SL AMB  POCT GLUCOSE, UA: NEGATIVE
SL AMB POCT URINE PROTEIN: NEGATIVE

## 2025-02-21 PROCEDURE — PNV: Performed by: OBSTETRICS & GYNECOLOGY

## 2025-02-21 PROCEDURE — 81002 URINALYSIS NONAUTO W/O SCOPE: CPT | Performed by: OBSTETRICS & GYNECOLOGY

## 2025-02-21 NOTE — PROGRESS NOTES
PN visit    35w5d  CS scheduled 3/24/25 w/ [place]  UTD on Tdap    Round ligament pain  Denies loss of fluid, contractions or bleeding  +Fetal movement

## 2025-02-24 NOTE — PROGRESS NOTES
Routine PN visit.   GBS next visit.   RLTCS is scheduled.     Grandfather passed away this past week, so having normal grief.  Does overall feel she is doing much better with zoloft.       Baby is very active.  No bleeding, LOF, contractions.

## 2025-02-28 ENCOUNTER — ROUTINE PRENATAL (OUTPATIENT)
Age: 30
End: 2025-02-28
Payer: COMMERCIAL

## 2025-02-28 VITALS — DIASTOLIC BLOOD PRESSURE: 64 MMHG | WEIGHT: 177 LBS | SYSTOLIC BLOOD PRESSURE: 102 MMHG | BODY MASS INDEX: 31.35 KG/M2

## 2025-02-28 DIAGNOSIS — Z34.83 PRENATAL CARE, SUBSEQUENT PREGNANCY, THIRD TRIMESTER: Primary | ICD-10-CM

## 2025-02-28 LAB
SL AMB  POCT GLUCOSE, UA: NORMAL
SL AMB POCT URINE PROTEIN: NORMAL

## 2025-02-28 PROCEDURE — 87150 DNA/RNA AMPLIFIED PROBE: CPT | Performed by: OBSTETRICS & GYNECOLOGY

## 2025-02-28 PROCEDURE — 81002 URINALYSIS NONAUTO W/O SCOPE: CPT | Performed by: OBSTETRICS & GYNECOLOGY

## 2025-02-28 PROCEDURE — PNV: Performed by: OBSTETRICS & GYNECOLOGY

## 2025-02-28 NOTE — PROGRESS NOTES
36w5d  CS scheduled 3/24/25 w/[facility]  GBS TODAY  Does not plan to breastfeed  Tdap Up to date   RPR =Negative  Hibiclens given today    Denies Leaking of Fluid, vaginal bleeding, contractions  +Fetal Movement

## 2025-03-03 ENCOUNTER — RESULTS FOLLOW-UP (OUTPATIENT)
Dept: OTHER | Facility: HOSPITAL | Age: 30
End: 2025-03-03

## 2025-03-03 LAB — GP B STREP DNA SPEC QL NAA+PROBE: NEGATIVE

## 2025-03-05 ENCOUNTER — ROUTINE PRENATAL (OUTPATIENT)
Age: 30
End: 2025-03-05
Payer: COMMERCIAL

## 2025-03-05 VITALS — DIASTOLIC BLOOD PRESSURE: 80 MMHG | BODY MASS INDEX: 31.46 KG/M2 | WEIGHT: 177.6 LBS | SYSTOLIC BLOOD PRESSURE: 122 MMHG

## 2025-03-05 DIAGNOSIS — Z34.83 PRENATAL CARE, SUBSEQUENT PREGNANCY, THIRD TRIMESTER: Primary | ICD-10-CM

## 2025-03-05 LAB
SL AMB  POCT GLUCOSE, UA: NORMAL
SL AMB POCT URINE PROTEIN: NORMAL

## 2025-03-05 PROCEDURE — PNV: Performed by: OBSTETRICS & GYNECOLOGY

## 2025-03-05 PROCEDURE — 81002 URINALYSIS NONAUTO W/O SCOPE: CPT | Performed by: OBSTETRICS & GYNECOLOGY

## 2025-03-05 NOTE — PROGRESS NOTES
37w3d  CS scheduled 3/24/25 w/[facility] (Has Hibiclens )  GBS Negative  Does not plan to breastfeed  Tdap Up to date   RPR =Negative      Denies Leaking of Fluid, vaginal bleeding, contractions   +Fetal Movement

## 2025-03-10 ENCOUNTER — TELEPHONE (OUTPATIENT)
Age: 30
End: 2025-03-10

## 2025-03-10 NOTE — TELEPHONE ENCOUNTER
Sofie from [employer] calling to see if patient delivered or is still pregnant. RN confirmed patient is still pregnant. No further questions.

## 2025-03-13 ENCOUNTER — ROUTINE PRENATAL (OUTPATIENT)
Age: 30
End: 2025-03-13
Payer: COMMERCIAL

## 2025-03-13 VITALS — DIASTOLIC BLOOD PRESSURE: 64 MMHG | WEIGHT: 178.6 LBS | BODY MASS INDEX: 31.64 KG/M2 | SYSTOLIC BLOOD PRESSURE: 102 MMHG

## 2025-03-13 DIAGNOSIS — Z34.83 PRENATAL CARE, SUBSEQUENT PREGNANCY, THIRD TRIMESTER: Primary | ICD-10-CM

## 2025-03-13 LAB
SL AMB  POCT GLUCOSE, UA: NORMAL
SL AMB POCT URINE PROTEIN: NORMAL

## 2025-03-13 PROCEDURE — 81002 URINALYSIS NONAUTO W/O SCOPE: CPT | Performed by: OBSTETRICS & GYNECOLOGY

## 2025-03-13 PROCEDURE — PNV: Performed by: OBSTETRICS & GYNECOLOGY

## 2025-03-13 NOTE — PROGRESS NOTES
38w3d  CS scheduled 3/24/25 w/[facility] (Has Hibiclens )  GBS Negative  Does not plan to breastfeed  Tdap Up to date   RPR =Negative      Denies Leaking of Fluid, vaginal bleeding, contractions   +Fetal Movement

## 2025-03-18 ENCOUNTER — ROUTINE PRENATAL (OUTPATIENT)
Age: 30
End: 2025-03-18
Payer: COMMERCIAL

## 2025-03-18 ENCOUNTER — HOSPITAL ENCOUNTER (OUTPATIENT)
Facility: HOSPITAL | Age: 30
Discharge: HOME | End: 2025-03-18
Attending: OBSTETRICS & GYNECOLOGY | Admitting: OBSTETRICS & GYNECOLOGY
Payer: COMMERCIAL

## 2025-03-18 VITALS
OXYGEN SATURATION: 95 % | RESPIRATION RATE: 16 BRPM | WEIGHT: 177.6 LBS | TEMPERATURE: 97.9 F | HEART RATE: 68 BPM | HEIGHT: 63 IN | SYSTOLIC BLOOD PRESSURE: 112 MMHG | BODY MASS INDEX: 31.47 KG/M2 | DIASTOLIC BLOOD PRESSURE: 67 MMHG

## 2025-03-18 VITALS — SYSTOLIC BLOOD PRESSURE: 128 MMHG | BODY MASS INDEX: 31.46 KG/M2 | DIASTOLIC BLOOD PRESSURE: 78 MMHG | WEIGHT: 177.6 LBS

## 2025-03-18 DIAGNOSIS — Z34.83 PRENATAL CARE, SUBSEQUENT PREGNANCY, THIRD TRIMESTER: Primary | ICD-10-CM

## 2025-03-18 PROBLEM — O16.3 ELEVATED BLOOD PRESSURE AFFECTING PREGNANCY IN THIRD TRIMESTER, ANTEPARTUM: Status: ACTIVE | Noted: 2025-03-18

## 2025-03-18 LAB
ALBUMIN SERPL BCG-MCNC: 3.5 G/DL (ref 3.5–5)
ALP SERPL-CCNC: 104 U/L (ref 34–104)
ALT SERPL W P-5'-P-CCNC: 8 U/L (ref 7–52)
ANION GAP SERPL CALCULATED.3IONS-SCNC: 8 MMOL/L (ref 4–13)
AST SERPL W P-5'-P-CCNC: 12 U/L (ref 13–39)
BILIRUB SERPL-MCNC: 0.48 MG/DL (ref 0.2–1)
BUN SERPL-MCNC: 8 MG/DL (ref 5–25)
CALCIUM SERPL-MCNC: 8.7 MG/DL (ref 8.4–10.2)
CHLORIDE SERPL-SCNC: 106 MMOL/L (ref 96–108)
CO2 SERPL-SCNC: 21 MMOL/L (ref 21–32)
CREAT SERPL-MCNC: 0.37 MG/DL (ref 0.6–1.3)
CREAT UR-MCNC: 39.1 MG/DL
ERYTHROCYTE [DISTWIDTH] IN BLOOD BY AUTOMATED COUNT: 13 % (ref 11.6–15.1)
GFR SERPL CREATININE-BSD FRML MDRD: 142 ML/MIN/1.73SQ M
GLUCOSE SERPL-MCNC: 72 MG/DL (ref 65–140)
HCT VFR BLD AUTO: 41.7 % (ref 34.8–46.1)
HGB BLD-MCNC: 14.3 G/DL (ref 11.5–15.4)
MCH RBC QN AUTO: 31 PG (ref 26.8–34.3)
MCHC RBC AUTO-ENTMCNC: 34.3 G/DL (ref 31.4–37.4)
MCV RBC AUTO: 91 FL (ref 82–98)
PLATELET # BLD AUTO: 203 THOUSANDS/UL (ref 149–390)
PMV BLD AUTO: 9.7 FL (ref 8.9–12.7)
POTASSIUM SERPL-SCNC: 3.9 MMOL/L (ref 3.5–5.3)
PROT SERPL-MCNC: 6.5 G/DL (ref 6.4–8.4)
PROT UR-MCNC: 6.6 MG/DL
PROT/CREAT UR: 0.2 MG/G{CREAT} (ref 0–0.1)
RBC # BLD AUTO: 4.61 MILLION/UL (ref 3.81–5.12)
SL AMB  POCT GLUCOSE, UA: NEGATIVE
SL AMB POCT URINE PROTEIN: NEGATIVE
SODIUM SERPL-SCNC: 135 MMOL/L (ref 135–147)
URATE SERPL-MCNC: 3.6 MG/DL (ref 2–7.5)
WBC # BLD AUTO: 10.53 THOUSAND/UL (ref 4.31–10.16)

## 2025-03-18 PROCEDURE — 82570 ASSAY OF URINE CREATININE: CPT | Performed by: STUDENT IN AN ORGANIZED HEALTH CARE EDUCATION/TRAINING PROGRAM

## 2025-03-18 PROCEDURE — 99213 OFFICE O/P EST LOW 20 MIN: CPT | Performed by: STUDENT IN AN ORGANIZED HEALTH CARE EDUCATION/TRAINING PROGRAM

## 2025-03-18 PROCEDURE — 59025 FETAL NON-STRESS TEST: CPT | Performed by: STUDENT IN AN ORGANIZED HEALTH CARE EDUCATION/TRAINING PROGRAM

## 2025-03-18 PROCEDURE — 81002 URINALYSIS NONAUTO W/O SCOPE: CPT | Performed by: OBSTETRICS & GYNECOLOGY

## 2025-03-18 PROCEDURE — PNV: Performed by: OBSTETRICS & GYNECOLOGY

## 2025-03-18 PROCEDURE — 80053 COMPREHEN METABOLIC PANEL: CPT | Performed by: STUDENT IN AN ORGANIZED HEALTH CARE EDUCATION/TRAINING PROGRAM

## 2025-03-18 PROCEDURE — 84550 ASSAY OF BLOOD/URIC ACID: CPT | Performed by: STUDENT IN AN ORGANIZED HEALTH CARE EDUCATION/TRAINING PROGRAM

## 2025-03-18 PROCEDURE — 84156 ASSAY OF PROTEIN URINE: CPT | Performed by: STUDENT IN AN ORGANIZED HEALTH CARE EDUCATION/TRAINING PROGRAM

## 2025-03-18 PROCEDURE — 85027 COMPLETE CBC AUTOMATED: CPT | Performed by: STUDENT IN AN ORGANIZED HEALTH CARE EDUCATION/TRAINING PROGRAM

## 2025-03-18 PROCEDURE — 99213 OFFICE O/P EST LOW 20 MIN: CPT

## 2025-03-18 NOTE — PROGRESS NOTES
39w2d  CS scheduled 3/24/25 w/[facility] (Has Hibiclens )  GBS Negative  Does not plan to breastfeed  Tdap Up to date   RPR =Negative    Denies Leaking of Fluid, vaginal bleeding, contractions   +Fetal Movement

## 2025-03-18 NOTE — PROGRESS NOTES
Routine PN visit.   BP elevated on arrival - just came from peds.    Normotensive on recheck.   Will have eval'ed in triage due to GA.  Discussed when delivery would be indicated.  BP check in office on Friday.  RLTCS scheduled on Monday.   Feeling well, some lightening crotch.  Baby is active.  No signs of labor.  Denies headaches/visual concerns/epigastric pain.   Precautions reviewed.

## 2025-03-18 NOTE — H&P
H&P - OB/GYN   Name: Cary Keys 29 y.o. female I MRN: [id number]  Unit/Bed#:  TRIAGE 3 I Date of Admission: 3/18/2025   Date of Service: 3/18/2025 I Hospital Day: 0     Assessment & Plan  Elevated blood pressure affecting pregnancy in third trimester, antepartum  - Blood pressures normal in triage. No signs/symptoms of pre-eclampsia.   - Pre-eclampsia panel shows no signs of end-organ damage. No significant proteinuria.  - Fetal status reassuring.   - Clinically stable for discharge to home. Patient will follow up in office for blood pressure check on Friday. Delivery is scheduled for Monday. Symptoms of pre-eclampsia reviewed including reasons to call office/on-call line.     History of Present Illness   Patient of:  [facility]  Brief Summary: Cary Keys  with an JEREMY of 3/23/2025, by Last Menstrual Period at 39w2d presenting for evaluation of elevated blood pressure.    Chief Complaint: Sent from office for elevated blood pressure.     HPI: Patient presents for evaluation of isolated elevated diastolic blood pressure in office today at routine prenatal care visit. Prior to today, all blood pressures have been normal. She denies headache, changes in vision, chest pain, SOB, RUQ pain, or peripheral swelling.     Contractions: None.   Leakage of fluid: None.   Bleeding: None.   Fetal movement: present.    Pregnancy complications: none.     Review of Systems   All other systems reviewed and are negative.      Historical Information   Historical Information   Past Medical History:   Diagnosis Date    Delivery by  section for breech presentation 2023    Dense breast tissue 2019    Varicella     had vaccine     Past Surgical History:   Procedure Laterality Date    DILATION AND EVACUATION      [facility]  DELIVERY ONLY N/A 2023    Procedure:  SECTION ();  Surgeon: Agnieszka Wallace MD;  Location: [facility];  Service: Obstetrics    [facility] EXTERNAL  CEPHALIC VERSION W/WO TOCOLYSIS N/A 2023    Procedure: VERSION EXTERNAL CEPHALIC;  Surgeon: Agnieszka Wallace MD;  Location: [facility];  Service: Obstetrics     Social History     Tobacco Use    Smoking status: Former     Current packs/day: 0.00     Types: Cigarettes     Quit date: 2018     Years since quittin.5    Smokeless tobacco: Never   Vaping Use    Vaping status: Never Used   Substance and Sexual Activity    Alcohol use: Not Currently    Drug use: Never    Sexual activity: Yes     Partners: Male     E-Cigarette/Vaping    E-Cigarette Use Never User      E-Cigarette/Vaping Substances    Nicotine No     THC No     CBD No     Flavoring No      Family history non-contributory  Social History     Tobacco Use    Smoking status: Former     Current packs/day: 0.00     Types: Cigarettes     Quit date: 2018     Years since quittin.5    Smokeless tobacco: Never   Vaping Use    Vaping status: Never Used   Substance and Sexual Activity    Alcohol use: Not Currently    Drug use: Never    Sexual activity: Yes     Partners: Male     Prior to Admission Medications   Prescriptions Last Dose Informant Patient Reported? Taking?   Prenatal MV-Min-Fe Fum-FA-DHA (PRENATAL+DHA PO) 3/17/2025 Bedtime Self Yes Yes   Sig: Take by mouth   diphenhydrAMINE (BENADRYL) 25 mg tablet 3/17/2025 Bedtime Self Yes Yes   Sig: Take 25 mg by mouth every 6 (six) hours as needed for itching   loratadine (CLARITIN) 10 mg tablet 3/17/2025 Bedtime Self Yes Yes   Sig: Take 10 mg by mouth daily   magnesium 30 MG tablet 3/17/2025 Bedtime Self Yes Yes   Sig: Take 30 mg by mouth in the morning   sertraline (ZOLOFT) 25 mg tablet 3/17/2025 Bedtime Self No Yes   Sig: TAKE 1 TABLET (25 MG TOTAL) BY MOUTH DAILY.      Facility-Administered Medications: None     Patient has no known allergies.  OB History    Para Term  AB Living   3 1 1 0 1 1   SAB IAB Ectopic Multiple Live Births   0 1 0 0 1      # Outcome Date GA Lbr Blayne/2nd Weight  Sex Type Anes PTL Lv   3 Current            2 Term 09/21/23 39w5d  3320 g (7 lb 5.1 oz) M CS-LTranv Spinal  MARLENI   1 IAB 2012     TAB        Baby complications/comments: n/a    Objective :  Temp:  [97.9 °F (36.6 °C)] 97.9 °F (36.6 °C)  HR:  [68] 68  BP: (112-128)/(67-98) 112/67  Resp:  [16] 16  SpO2:  [95 %] 95 %    Physical Exam  Vitals reviewed.   Constitutional:       Appearance: Normal appearance.   HENT:      Head: Normocephalic and atraumatic.      Mouth/Throat:      Mouth: Mucous membranes are moist.   Cardiovascular:      Rate and Rhythm: Normal rate and regular rhythm.      Heart sounds: Normal heart sounds. No murmur heard.     No friction rub. No gallop.   Pulmonary:      Effort: Pulmonary effort is normal. No respiratory distress.      Breath sounds: Normal breath sounds. No stridor. No wheezing, rhonchi or rales.   Abdominal:      General: There is no distension.      Tenderness: There is no abdominal tenderness. There is no guarding or rebound.      Comments: Gravid, no RUQ tenderness   Musculoskeletal:         General: Normal range of motion.      Cervical back: Normal range of motion.      Right lower leg: No edema.      Left lower leg: No edema.   Skin:     General: Skin is warm and dry.   Neurological:      General: No focal deficit present.      Mental Status: She is alert and oriented to person, place, and time. Mental status is at baseline.      Deep Tendon Reflexes: Reflexes normal.   Psychiatric:         Mood and Affect: Mood normal.         Behavior: Behavior normal.         Thought Content: Thought content normal.         Judgment: Judgment normal.        Cervical Exam   Deferred    Contractions:  Contraction Frequency (minutes): 1 ctx  Contraction Duration (seconds): 60  Contraction Intensity: Mild  Fetal heart rate  Baseline Rate (FHR): 135 bpm  Variability: Moderate  Accelerations: 15 x 15 or greater  Decelerations: None  FHR Category: Reactive, no decelerations      Lab Results: I have  "reviewed the following results:  Strep Grp B PCR   Date Value Ref Range Status   02/28/2025 Negative Negative Final     Comment:           No results found for: \"STREPGPB\"  No results found for: \"RPR\"  Hepatitis B Surface Ag   Date Value Ref Range Status   10/01/2024 NR  Final     No components found for: \"EXTHEPBSAG\"  No results found for: \"HEPCAB\"  No results found for: \"RUBELLAIGGQT\"  External Rubella IGG Quantitation   Date Value Ref Range Status   10/01/2024 immune  Final     HIV-1/HIV-2 AB   Date Value Ref Range Status   10/01/2024 Non-Reactive  Final     No components found for: \"OCZMQK3MX\"  No results found for: \"LABNGO\", \"LABCHLA\"    Type & Screen:  ABO Grouping   Date Value Ref Range Status   10/01/2024 A  Final   09/21/2023 A  Final     Rh Factor   Date Value Ref Range Status   09/21/2023 Positive  Final     No results found for: \"ANTIBODYSCR\"  Specimen Expiration Date   Date Value Ref Range Status   09/21/2023 [id number]  Final         "

## 2025-03-18 NOTE — ASSESSMENT & PLAN NOTE
- Blood pressures normal in triage. No signs/symptoms of pre-eclampsia.   - Pre-eclampsia panel shows no signs of end-organ damage. No significant proteinuria.  - Fetal status reassuring.   - Clinically stable for discharge to home. Patient will follow up in office for blood pressure check on Friday. Delivery is scheduled for Monday. Symptoms of pre-eclampsia reviewed including reasons to call office/on-call line.

## 2025-03-18 NOTE — PROCEDURES
Cary Keys, a  at 39w2d with an JEREMY of 3/23/2025, by Last Menstrual Period, was seen at [facility] for the following procedure(s): $Procedure Type: NST]    Nonstress Test  Reason for NST: Hypertension  Variability: Moderate  Decelerations: None  Accelerations: Yes  Acoustic Stimulator: No  Baseline: 135 BPM  Uterine Irritability: No  Contractions: Irregular           Interpretation  Nonstress Test Interpretation: Reactive  Overall Impression: Reassuring    Benedict Saini MD  3/18/2025 1:30 PM

## 2025-03-21 ENCOUNTER — ANESTHESIA EVENT (INPATIENT)
Dept: LABOR AND DELIVERY | Facility: HOSPITAL | Age: 30
End: 2025-03-21
Payer: COMMERCIAL

## 2025-03-21 ENCOUNTER — HOSPITAL ENCOUNTER (INPATIENT)
Facility: HOSPITAL | Age: 30
LOS: 2 days | Discharge: HOME | End: 2025-03-23
Attending: OBSTETRICS & GYNECOLOGY | Admitting: OBSTETRICS & GYNECOLOGY
Payer: COMMERCIAL

## 2025-03-21 ENCOUNTER — ROUTINE PRENATAL (OUTPATIENT)
Age: 30
End: 2025-03-21

## 2025-03-21 ENCOUNTER — ANESTHESIA (INPATIENT)
Dept: LABOR AND DELIVERY | Facility: HOSPITAL | Age: 30
End: 2025-03-21
Payer: COMMERCIAL

## 2025-03-21 VITALS
HEIGHT: 63 IN | BODY MASS INDEX: 31.36 KG/M2 | SYSTOLIC BLOOD PRESSURE: 150 MMHG | DIASTOLIC BLOOD PRESSURE: 88 MMHG | WEIGHT: 177 LBS

## 2025-03-21 DIAGNOSIS — O34.219 HISTORY OF CESAREAN DELIVERY, ANTEPARTUM: ICD-10-CM

## 2025-03-21 DIAGNOSIS — O16.3 ELEVATED BLOOD PRESSURE AFFECTING PREGNANCY IN THIRD TRIMESTER, ANTEPARTUM: Primary | ICD-10-CM

## 2025-03-21 DIAGNOSIS — Z98.891 S/P REPEAT LOW TRANSVERSE C-SECTION: ICD-10-CM

## 2025-03-21 DIAGNOSIS — O09.899 SHORT INTERVAL BETWEEN PREGNANCIES AFFECTING PREGNANCY, ANTEPARTUM: ICD-10-CM

## 2025-03-21 PROBLEM — O13.3 GESTATIONAL HYPERTENSION, THIRD TRIMESTER: Status: ACTIVE | Noted: 2025-03-21

## 2025-03-21 LAB
ABO GROUP BLD: NORMAL
ALBUMIN SERPL BCG-MCNC: 3.6 G/DL (ref 3.5–5)
ALP SERPL-CCNC: 123 U/L (ref 34–104)
ALT SERPL W P-5'-P-CCNC: 7 U/L (ref 7–52)
ANION GAP SERPL CALCULATED.3IONS-SCNC: 9 MMOL/L (ref 4–13)
AST SERPL W P-5'-P-CCNC: 11 U/L (ref 13–39)
BASE EXCESS BLDCOV CALC-SCNC: -3.6 MMOL/L (ref 1–9)
BILIRUB SERPL-MCNC: 0.7 MG/DL (ref 0.2–1)
BLD GP AB SCN SERPL QL: NEGATIVE
BUN SERPL-MCNC: 10 MG/DL (ref 5–25)
CALCIUM SERPL-MCNC: 9 MG/DL (ref 8.4–10.2)
CHLORIDE SERPL-SCNC: 105 MMOL/L (ref 96–108)
CO2 SERPL-SCNC: 20 MMOL/L (ref 21–32)
CREAT SERPL-MCNC: 0.42 MG/DL (ref 0.6–1.3)
CREAT UR-MCNC: 106.6 MG/DL
ERYTHROCYTE [DISTWIDTH] IN BLOOD BY AUTOMATED COUNT: 13 % (ref 11.6–15.1)
GFR SERPL CREATININE-BSD FRML MDRD: 137 ML/MIN/1.73SQ M
GLUCOSE SERPL-MCNC: 64 MG/DL (ref 65–140)
HCO3 BLDCOV-SCNC: 21.4 MMOL/L (ref 12.2–28.6)
HCT VFR BLD AUTO: 44.6 % (ref 34.8–46.1)
HGB BLD-MCNC: 15.1 G/DL (ref 11.5–15.4)
HOLD SPECIMEN: NORMAL
MCH RBC QN AUTO: 30.6 PG (ref 26.8–34.3)
MCHC RBC AUTO-ENTMCNC: 33.9 G/DL (ref 31.4–37.4)
MCV RBC AUTO: 90 FL (ref 82–98)
OXYHGB MFR BLDCOV: 79.7 %
PCO2 BLDCOV: 38.9 MM HG (ref 27–43)
PH BLDCOV: 7.36 [PH] (ref 7.19–7.49)
PLATELET # BLD AUTO: 203 THOUSANDS/UL (ref 149–390)
PMV BLD AUTO: 10 FL (ref 8.9–12.7)
PO2 BLDCOV: 37.7 MM HG (ref 15–45)
POTASSIUM SERPL-SCNC: 3.7 MMOL/L (ref 3.5–5.3)
PROT SERPL-MCNC: 6.5 G/DL (ref 6.4–8.4)
PROT UR-MCNC: 17.6 MG/DL
PROT/CREAT UR: 0.2 MG/G{CREAT} (ref 0–0.1)
RBC # BLD AUTO: 4.94 MILLION/UL (ref 3.81–5.12)
RH BLD: POSITIVE
SAO2 % BLDCOV: 14.9 ML/DL
SODIUM SERPL-SCNC: 134 MMOL/L (ref 135–147)
SPECIMEN EXPIRATION DATE: NORMAL
WBC # BLD AUTO: 10.08 THOUSAND/UL (ref 4.31–10.16)

## 2025-03-21 PROCEDURE — NC001 PR NO CHARGE: Performed by: OBSTETRICS & GYNECOLOGY

## 2025-03-21 PROCEDURE — 85027 COMPLETE CBC AUTOMATED: CPT | Performed by: OBSTETRICS & GYNECOLOGY

## 2025-03-21 PROCEDURE — 88307 TISSUE EXAM BY PATHOLOGIST: CPT | Performed by: STUDENT IN AN ORGANIZED HEALTH CARE EDUCATION/TRAINING PROGRAM

## 2025-03-21 PROCEDURE — 86900 BLOOD TYPING SEROLOGIC ABO: CPT | Performed by: OBSTETRICS & GYNECOLOGY

## 2025-03-21 PROCEDURE — PNV: Performed by: OBSTETRICS & GYNECOLOGY

## 2025-03-21 PROCEDURE — 82570 ASSAY OF URINE CREATININE: CPT | Performed by: OBSTETRICS & GYNECOLOGY

## 2025-03-21 PROCEDURE — 59514 CESAREAN DELIVERY ONLY: CPT | Performed by: OBSTETRICS & GYNECOLOGY

## 2025-03-21 PROCEDURE — 86901 BLOOD TYPING SEROLOGIC RH(D): CPT | Performed by: OBSTETRICS & GYNECOLOGY

## 2025-03-21 PROCEDURE — 86850 RBC ANTIBODY SCREEN: CPT | Performed by: OBSTETRICS & GYNECOLOGY

## 2025-03-21 PROCEDURE — 82805 BLOOD GASES W/O2 SATURATION: CPT | Performed by: OBSTETRICS & GYNECOLOGY

## 2025-03-21 PROCEDURE — 86780 TREPONEMA PALLIDUM: CPT | Performed by: OBSTETRICS & GYNECOLOGY

## 2025-03-21 PROCEDURE — 84156 ASSAY OF PROTEIN URINE: CPT | Performed by: OBSTETRICS & GYNECOLOGY

## 2025-03-21 PROCEDURE — 4A1HXCZ MONITORING OF PRODUCTS OF CONCEPTION, CARDIAC RATE, EXTERNAL APPROACH: ICD-10-PCS | Performed by: OBSTETRICS & GYNECOLOGY

## 2025-03-21 PROCEDURE — 80053 COMPREHEN METABOLIC PANEL: CPT | Performed by: OBSTETRICS & GYNECOLOGY

## 2025-03-21 PROCEDURE — 59510 CESAREAN DELIVERY: CPT | Performed by: OBSTETRICS & GYNECOLOGY

## 2025-03-21 RX ORDER — CITRIC ACID/SODIUM CITRATE 334-500MG
30 SOLUTION, ORAL ORAL ONCE
Status: COMPLETED | OUTPATIENT
Start: 2025-03-21 | End: 2025-03-21

## 2025-03-21 RX ORDER — DIPHENHYDRAMINE HCL 25 MG
25 TABLET ORAL EVERY 6 HOURS PRN
Status: DISCONTINUED | OUTPATIENT
Start: 2025-03-21 | End: 2025-03-23 | Stop reason: HOSPADM

## 2025-03-21 RX ORDER — KETOROLAC TROMETHAMINE 30 MG/ML
30 INJECTION, SOLUTION INTRAMUSCULAR; INTRAVENOUS EVERY 6 HOURS SCHEDULED
Status: DISCONTINUED | OUTPATIENT
Start: 2025-03-22 | End: 2025-03-22

## 2025-03-21 RX ORDER — LORATADINE 10 MG/1
10 TABLET ORAL DAILY
Status: DISCONTINUED | OUTPATIENT
Start: 2025-03-22 | End: 2025-03-23 | Stop reason: HOSPADM

## 2025-03-21 RX ORDER — DEXAMETHASONE SODIUM PHOSPHATE 10 MG/ML
INJECTION, SOLUTION INTRAMUSCULAR; INTRAVENOUS AS NEEDED
Status: DISCONTINUED | OUTPATIENT
Start: 2025-03-21 | End: 2025-03-21

## 2025-03-21 RX ORDER — MORPHINE SULFATE 0.5 MG/ML
INJECTION, SOLUTION EPIDURAL; INTRATHECAL; INTRAVENOUS AS NEEDED
Status: DISCONTINUED | OUTPATIENT
Start: 2025-03-21 | End: 2025-03-21

## 2025-03-21 RX ORDER — ONDANSETRON 2 MG/ML
INJECTION INTRAMUSCULAR; INTRAVENOUS AS NEEDED
Status: DISCONTINUED | OUTPATIENT
Start: 2025-03-21 | End: 2025-03-21

## 2025-03-21 RX ORDER — CEFAZOLIN SODIUM 2 G/50ML
2000 SOLUTION INTRAVENOUS ONCE
Status: COMPLETED | OUTPATIENT
Start: 2025-03-21 | End: 2025-03-21

## 2025-03-21 RX ORDER — OXYTOCIN/RINGER'S LACTATE 30/500 ML
62.5 PLASTIC BAG, INJECTION (ML) INTRAVENOUS ONCE
Status: COMPLETED | OUTPATIENT
Start: 2025-03-21 | End: 2025-03-22

## 2025-03-21 RX ORDER — FENTANYL CITRATE/PF 50 MCG/ML
25 SYRINGE (ML) INJECTION
Status: DISCONTINUED | OUTPATIENT
Start: 2025-03-21 | End: 2025-03-23 | Stop reason: HOSPADM

## 2025-03-21 RX ORDER — ONDANSETRON 2 MG/ML
4 INJECTION INTRAMUSCULAR; INTRAVENOUS ONCE
Status: COMPLETED | OUTPATIENT
Start: 2025-03-21 | End: 2025-03-21

## 2025-03-21 RX ORDER — CALCIUM CARBONATE 500 MG/1
1000 TABLET, CHEWABLE ORAL DAILY PRN
Status: DISCONTINUED | OUTPATIENT
Start: 2025-03-21 | End: 2025-03-23 | Stop reason: HOSPADM

## 2025-03-21 RX ORDER — BUPIVACAINE HYDROCHLORIDE 7.5 MG/ML
INJECTION, SOLUTION INTRASPINAL AS NEEDED
Status: DISCONTINUED | OUTPATIENT
Start: 2025-03-21 | End: 2025-03-21

## 2025-03-21 RX ORDER — SODIUM CHLORIDE, SODIUM LACTATE, POTASSIUM CHLORIDE, CALCIUM CHLORIDE 600; 310; 30; 20 MG/100ML; MG/100ML; MG/100ML; MG/100ML
125 INJECTION, SOLUTION INTRAVENOUS CONTINUOUS
Status: DISCONTINUED | OUTPATIENT
Start: 2025-03-21 | End: 2025-03-22

## 2025-03-21 RX ORDER — ACETAMINOPHEN 325 MG/1
650 TABLET ORAL EVERY 6 HOURS SCHEDULED
Status: DISCONTINUED | OUTPATIENT
Start: 2025-03-21 | End: 2025-03-23 | Stop reason: HOSPADM

## 2025-03-21 RX ORDER — NALBUPHINE HYDROCHLORIDE 10 MG/ML
5 INJECTION INTRAMUSCULAR; INTRAVENOUS; SUBCUTANEOUS
Status: DISPENSED | OUTPATIENT
Start: 2025-03-21 | End: 2025-03-22

## 2025-03-21 RX ORDER — IBUPROFEN 600 MG/1
600 TABLET, FILM COATED ORAL EVERY 6 HOURS
Status: DISCONTINUED | OUTPATIENT
Start: 2025-03-23 | End: 2025-03-22

## 2025-03-21 RX ORDER — OXYTOCIN/RINGER'S LACTATE 30/500 ML
PLASTIC BAG, INJECTION (ML) INTRAVENOUS CONTINUOUS PRN
Status: DISCONTINUED | OUTPATIENT
Start: 2025-03-21 | End: 2025-03-21

## 2025-03-21 RX ORDER — METOCLOPRAMIDE HYDROCHLORIDE 5 MG/ML
10 INJECTION INTRAMUSCULAR; INTRAVENOUS EVERY 6 HOURS PRN
Status: DISCONTINUED | OUTPATIENT
Start: 2025-03-21 | End: 2025-03-23 | Stop reason: HOSPADM

## 2025-03-21 RX ORDER — NALOXONE HYDROCHLORIDE 0.4 MG/ML
0.1 INJECTION, SOLUTION INTRAMUSCULAR; INTRAVENOUS; SUBCUTANEOUS
Status: ACTIVE | OUTPATIENT
Start: 2025-03-21 | End: 2025-03-22

## 2025-03-21 RX ORDER — DOCUSATE SODIUM 100 MG/1
100 CAPSULE, LIQUID FILLED ORAL 2 TIMES DAILY
Status: DISCONTINUED | OUTPATIENT
Start: 2025-03-21 | End: 2025-03-23 | Stop reason: HOSPADM

## 2025-03-21 RX ORDER — ONDANSETRON 2 MG/ML
4 INJECTION INTRAMUSCULAR; INTRAVENOUS EVERY 8 HOURS PRN
Status: DISCONTINUED | OUTPATIENT
Start: 2025-03-21 | End: 2025-03-21

## 2025-03-21 RX ORDER — SERTRALINE HYDROCHLORIDE 25 MG/1
25 TABLET, FILM COATED ORAL DAILY
Status: DISCONTINUED | OUTPATIENT
Start: 2025-03-22 | End: 2025-03-23 | Stop reason: HOSPADM

## 2025-03-21 RX ORDER — SODIUM CHLORIDE, SODIUM LACTATE, POTASSIUM CHLORIDE, CALCIUM CHLORIDE 600; 310; 30; 20 MG/100ML; MG/100ML; MG/100ML; MG/100ML
125 INJECTION, SOLUTION INTRAVENOUS CONTINUOUS
Status: DISCONTINUED | OUTPATIENT
Start: 2025-03-21 | End: 2025-03-21

## 2025-03-21 RX ORDER — KETOROLAC TROMETHAMINE 30 MG/ML
INJECTION, SOLUTION INTRAMUSCULAR; INTRAVENOUS AS NEEDED
Status: DISCONTINUED | OUTPATIENT
Start: 2025-03-21 | End: 2025-03-21

## 2025-03-21 RX ADMIN — SODIUM CHLORIDE, SODIUM LACTATE, POTASSIUM CHLORIDE, AND CALCIUM CHLORIDE 1000 ML: .6; .31; .03; .02 INJECTION, SOLUTION INTRAVENOUS at 16:30

## 2025-03-21 RX ADMIN — METOCLOPRAMIDE 10 MG: 5 INJECTION, SOLUTION INTRAMUSCULAR; INTRAVENOUS at 20:31

## 2025-03-21 RX ADMIN — Medication 62.5 MILLI-UNITS/MIN: at 19:14

## 2025-03-21 RX ADMIN — KETOROLAC TROMETHAMINE 30 MG: 30 INJECTION, SOLUTION INTRAMUSCULAR; INTRAVENOUS at 18:43

## 2025-03-21 RX ADMIN — SODIUM CHLORIDE, SODIUM LACTATE, POTASSIUM CHLORIDE, AND CALCIUM CHLORIDE 125 ML/HR: .6; .31; .03; .02 INJECTION, SOLUTION INTRAVENOUS at 20:14

## 2025-03-21 RX ADMIN — SODIUM CITRATE AND CITRIC ACID MONOHYDRATE 30 ML: 500; 334 SOLUTION ORAL at 17:16

## 2025-03-21 RX ADMIN — SODIUM CHLORIDE, SODIUM LACTATE, POTASSIUM CHLORIDE, AND CALCIUM CHLORIDE 125 ML/HR: .6; .31; .03; .02 INJECTION, SOLUTION INTRAVENOUS at 19:09

## 2025-03-21 RX ADMIN — PHENYLEPHRINE HYDROCHLORIDE 50 MCG/MIN: 10 INJECTION, SOLUTION INTRAVENOUS at 17:55

## 2025-03-21 RX ADMIN — BUPIVACAINE HYDROCHLORIDE IN DEXTROSE 1.4 ML: 7.5 INJECTION, SOLUTION SUBARACHNOID at 17:55

## 2025-03-21 RX ADMIN — DEXAMETHASONE SODIUM PHOSPHATE 10 MG: 10 INJECTION INTRAMUSCULAR; INTRAVENOUS at 18:26

## 2025-03-21 RX ADMIN — SODIUM CHLORIDE, SODIUM LACTATE, POTASSIUM CHLORIDE, AND CALCIUM CHLORIDE: .6; .31; .03; .02 INJECTION, SOLUTION INTRAVENOUS at 18:03

## 2025-03-21 RX ADMIN — ONDANSETRON 4 MG: 2 INJECTION, SOLUTION INTRAMUSCULAR; INTRAVENOUS at 18:26

## 2025-03-21 RX ADMIN — MORPHINE SULFATE 0.15 MG: 0.5 INJECTION EPIDURAL; INTRATHECAL; INTRAVENOUS at 17:55

## 2025-03-21 RX ADMIN — CEFAZOLIN SODIUM 2000 MG: 2 SOLUTION INTRAVENOUS at 17:59

## 2025-03-21 RX ADMIN — NALBUPHINE HYDROCHLORIDE 5 MG: 10 INJECTION INTRAMUSCULAR; INTRAVENOUS; SUBCUTANEOUS at 22:31

## 2025-03-21 RX ADMIN — ACETAMINOPHEN 650 MG: 325 TABLET, FILM COATED ORAL at 21:36

## 2025-03-21 RX ADMIN — ONDANSETRON 4 MG: 2 INJECTION, SOLUTION INTRAMUSCULAR; INTRAVENOUS at 23:56

## 2025-03-21 RX ADMIN — Medication 250 MILLI-UNITS/MIN: at 18:20

## 2025-03-21 NOTE — H&P
History & Physical - OB/GYN   Cary Keys 29 y.o. female MRN: [id number]  Unit/Bed#:  TRIAGE 2- Encounter: [id number]    Assessment:   29 y.o.  at 39w5d admitted for repeat  section in the setting of GHTN.    Plan:   1. Admit to L&D  2. Place IV and IV fluids  3. Labs: CBC, CMP, P:C, RPR, type and screen  4. Continue NPO  5. Fetal monitoring and toco  6. Anesthesia evaluation  7. Preop Antibiotics ordered       _____________________    Chief complaint:  My blood pressure was high    She is a patient of [facility].    HPI: Cary Keys is 29 y.o.  at 39w5d presenting for repeat  for GHTN    Contractions:  no  Fetal movement:  yes  Vaginal bleeding:   no  Leaking of fluid:  no      Pregnancy Complications:  Pregnancy Problems (from 24 to present)       Problem Noted Diagnosed Resolved    Elevated blood pressure affecting pregnancy in third trimester, antepartum 3/18/2025 by Benedict Saini MD  No              PMH:  Past Medical History:   Diagnosis Date    Delivery by  section for breech presentation 2023    Dense breast tissue 2019    Varicella     had vaccine       PSH:  Past Surgical History:   Procedure Laterality Date    DILATION AND EVACUATION      IN  DELIVERY ONLY N/A 2023    Procedure:  SECTION ();  Surgeon: Agnieszka Wallace MD;  Location: [facility];  Service: Obstetrics    IN EXTERNAL CEPHALIC VERSION W/WO TOCOLYSIS N/A 2023    Procedure: VERSION EXTERNAL CEPHALIC;  Surgeon: Agnieszka Wallace MD;  Location: [facility];  Service: Obstetrics       Social Hx:  Social History     Tobacco Use    Smoking status: Former     Current packs/day: 0.00     Types: Cigarettes     Quit date: 2018     Years since quittin.5    Smokeless tobacco: Never   Vaping Use    Vaping status: Never Used   Substance Use Topics    Alcohol use: Not Currently    Drug use: Never        OB Hx:  OB History  "   Para Term  AB Living   3 1 1 0 1 1   SAB IAB Ectopic Multiple Live Births   0 1 0 0 1      # Outcome Date GA Lbr Blayne/2nd Weight Sex Type Anes PTL Lv   3 Current            2 Term 23 39w5d  3320 g (7 lb 5.1 oz) M CS-LTranv Spinal  MARLENI      Name: KVNG,BABY BOY (MUKUL)      Apgar1: 9  Apgar5: 9   1 IAB      TAB          Meds:  No current facility-administered medications on file prior to encounter.     Current Outpatient Medications on File Prior to Encounter   Medication Sig Dispense Refill    diphenhydrAMINE (BENADRYL) 25 mg tablet Take 25 mg by mouth every 6 (six) hours as needed for itching      loratadine (CLARITIN) 10 mg tablet Take 10 mg by mouth daily      magnesium 30 MG tablet Take 30 mg by mouth in the morning      Prenatal MV-Min-Fe Fum-FA-DHA (PRENATAL+DHA PO) Take by mouth      sertraline (ZOLOFT) 25 mg tablet TAKE 1 TABLET (25 MG TOTAL) BY MOUTH DAILY. 90 tablet 1       Allergies:  No Known Allergies    Labs:  ABO Grouping   Date Value Ref Range Status   10/01/2024 A  Final   2023 A  Final      Rh Factor   Date Value Ref Range Status   2023 Positive  Final      Rh Factor   Date Value Ref Range Status   2023 Positive  Final     HIV-1/HIV-2 AB   Date Value Ref Range Status   10/01/2024 Non-Reactive  Final     Hepatitis B Surface Ag   Date Value Ref Range Status   10/01/2024 NR  Final     No results found for: \"HEPCAB\"  No results found for: \"RPR\"  No results found for: \"RUBELLAIGGQT\"  Glucose   Date Value Ref Range Status   2024 156 (A) 70 - 134 mg/dL Final   2023 142 (H) 40 - 134 mg/dL Final     Comment:     Specimen collection should occur prior to Sulfasalazine administration due to the potential for falsely depressed results. Specimen collection should occur prior to Sulfapyridine administration due to the potential for falsely elevated results.  <=134 Normal   135-179 Impaired glucose fasting. Perform 3 Hour Glucose Tolerance   >=180 Diagnosis " Gestational Diabetes Mellitus     Glucose, GTT - 3 Hour   Date Value Ref Range Status   12/23/2024 84  Final   07/01/2023 48 (L) 70 - 140 mg/dL Final     Strep Grp B PCR   Date Value Ref Range Status   02/28/2025 Negative Negative Final     Comment:             Physical Exam:  Temp 98.8 °F (37.1 °C) (Temporal)   Resp 18   LMP 06/16/2024 (Exact Date)     Gen: alert, no acute distress  Cardiac: regular rate  Resp: unlabored breathing  Abdomen: gravid, non-tender, non-distended  Extremities: non-tender, no edema    Estimated Fetal Weight: 8 lbs  Presentation: cephalic, confirmed via u/s    SVE:   c/t/L    FHT:  120's, +accels, no decels, mod variability     TOCO:  none     Membranes: intact       Acacia Boen, DO  3/21/2025 3:50 PM

## 2025-03-21 NOTE — ANESTHESIA PREPROCEDURE EVALUATION
Procedure:   SECTION () REPEAT (Uterus)    Relevant Problems   Obstetrics/Gynecology   (+) History of  delivery, antepartum   (+) Short interval between pregnancies affecting pregnancy, antepartum        Physical Exam    Airway    Mallampati score: II  TM Distance: >3 FB  Neck ROM: full     Dental   No notable dental hx     Cardiovascular      Pulmonary      Other Findings  post-pubertal.      Anesthesia Plan  ASA Score- 2     Anesthesia Type- spinal with ASA Monitors.         Additional Monitors:     Airway Plan:            Plan Factors-    Chart reviewed.   Existing labs reviewed. Patient summary reviewed.    Patient is not a current smoker.              Induction-     Postoperative Plan- Plan for postoperative opioid use.     Perioperative Resuscitation Plan - Level 1 - Full Code.       Informed Consent- Anesthetic plan and risks discussed with patient.  I personally reviewed this patient with the CRNA. Discussed and agreed on the Anesthesia Plan with the CRNA..      NPO Status:  Vitals Value Taken Time   Date of last liquid 25 1607   Time of last liquid 1000 25 1607   Date of last solid 25 1607   Time of last solid 0800 25 1607

## 2025-03-21 NOTE — ANESTHESIA POSTPROCEDURE EVALUATION
Post-Op Assessment Note    CV Status:  Stable  Pain Score: 0    Pain management: adequate       Mental Status:  Alert and awake   Hydration Status:  Euvolemic   PONV Controlled:  Controlled   Airway Patency:  Patent     Post Op Vitals Reviewed: Yes    No anethesia notable event occurred.    Staff: CRNA           Last Filed PACU Vitals:  Vitals Value Taken Time   Temp 97.9    Pulse 67    /58    Resp 16    SpO2 99 room air

## 2025-03-21 NOTE — PROGRESS NOTES
Here for BP check.   Had Diastolic in 90's on 3/18, and now BPs 152/90 today --> meets criteria for GHTN, so delivery is indicated.   For repeat section, aware to be NPO.  Will get her things and present to L&D, staff notified.   Otherwise feeling well.

## 2025-03-21 NOTE — ANESTHESIA PROCEDURE NOTES
Spinal Block    Patient location during procedure: OB/L&D  Start time: 3/21/2025 5:55 PM  Reason for block: at surgeon's request and primary anesthetic  Staffing  Performed by: Ibrahima Mello DO  Authorized by: Ibrahima Mello DO    Preanesthetic Checklist  Completed: patient identified, IV checked, site marked, risks and benefits discussed, surgical consent, monitors and equipment checked, pre-op evaluation and timeout performed  Spinal Block  Patient position: sitting  Prep: ChloraPrep and site prepped and draped  Patient monitoring: heart rate, cardiac monitor, continuous pulse ox and frequent blood pressure checks  Location: L3-4  Needle  Needle type: Pencan   Needle gauge: 24 G  Needle length: 4 in  Assessment  Sensory level: T4  Injection Assessment:  negative aspiration for heme and no paresthesia on injection.  Post-procedure:  site cleaned

## 2025-03-21 NOTE — PLAN OF CARE
Problem: BIRTH - VAGINAL/ SECTION  Goal: Fetal and maternal status remain reassuring during the birth process  Description: INTERVENTIONS:- Monitor vital signs- Monitor fetal heart rate- Monitor uterine activity- Monitor labor progression (vaginal delivery)- DVT prophylaxis- Antibiotic prophylaxis  Outcome: Progressing  Goal: Emotionally satisfying birthing experience for mother/fetus  Description: Interventions:- Assess, plan, implement and evaluate the nursing care given to the patient in labor- Advocate the philosophy that each childbirth experience is a unique experience and support the family's chosen level of involvement and control during the labor process - Actively participate in both the patient's and family's teaching of the birth process- Consider cultural, Yazdanism and age-specific factors and plan care for the patient in labor  Outcome: Progressing     Problem: PAIN - ADULT  Goal: Verbalizes/displays adequate comfort level or baseline comfort level  Description: Interventions:- Encourage patient to monitor pain and request assistance- Assess pain using appropriate pain scale- Administer analgesics based on type and severity of pain and evaluate response- Implement non-pharmacological measures as appropriate and evaluate response- Consider cultural and social influences on pain and pain management- Notify physician/advanced practitioner if interventions unsuccessful or patient reports new pain  Outcome: Progressing     Problem: INFECTION - ADULT  Goal: Absence or prevention of progression during hospitalization  Description: INTERVENTIONS:- Assess and monitor for signs and symptoms of infection- Monitor lab/diagnostic results- Monitor all insertion sites, i.e. indwelling lines, tubes, and drains- Monitor endotracheal if appropriate and nasal secretions for changes in amount and color- Conley appropriate cooling/warming therapies per order- Administer medications as ordered- Instruct and  encourage patient and family to use good hand hygiene technique- Identify and instruct in appropriate isolation precautions for identified infection/condition  Outcome: Progressing  Goal: Absence of fever/infection during neutropenic period  Description: INTERVENTIONS:- Monitor WBC  Outcome: Progressing     Problem: SAFETY ADULT  Goal: Patient will remain free of falls  Description: INTERVENTIONS:- Educate patient/family on patient safety including physical limitations- Instruct patient to call for assistance with activity - Consult OT/PT to assist with strengthening/mobility - Keep Call bell within reach- Keep bed low and locked with side rails adjusted as appropriate- Keep care items and personal belongings within reach- Initiate and maintain comfort rounds- Make Fall Risk Sign visible to staff-   Outcome: Progressing  Goal: Maintain or return to baseline ADL function  Description: INTERVENTIONS:-  Assess patient's ability to carry out ADLs; assess patient's baseline for ADL function and identify physical deficits which impact ability to perform ADLs (bathing, care of mouth/teeth, toileting, grooming, dressing, etc.)- Assess/evaluate cause of self-care deficits - Assess range of motion- Assess patient's mobility; develop plan if impaired- Assess patient's need for assistive devices and provide as appropriate- Encourage maximum independence but intervene and supervise when necessary- Involve family in performance of ADLs- Assess for home care needs following discharge - Consider OT consult to assist with ADL evaluation and planning for discharge- Provide patient education as appropriate  Outcome: Progressing  Goal: Maintains/Returns to pre admission functional level  Description: INTERVENTIONS:- Perform AM-PAC 6 Click Basic Mobility/ Daily Activity assessment daily.- Set and communicate daily mobility goal to care team and patient/family/caregiver. - Collaborate with rehabilitation services on mobility goals if  consulted-  Outcome: Progressing     Problem: Knowledge Deficit  Goal: Patient/family/caregiver demonstrates understanding of disease process, treatment plan, medications, and discharge instructions  Description: Complete learning assessment and assess knowledge base.Interventions:- Provide teaching at level of understanding- Provide teaching via preferred learning methods  Outcome: Progressing     Problem: DISCHARGE PLANNING  Goal: Discharge to home or other facility with appropriate resources  Description: INTERVENTIONS:- Identify barriers to discharge w/patient and caregiver- Arrange for needed discharge resources and transportation as appropriate- Identify discharge learning needs (meds, wound care, etc.)- Arrange for interpretive services to assist at discharge as needed- Refer to Case Management Department for coordinating discharge planning if the patient needs post-hospital services based on physician/advanced practitioner order or complex needs related to functional status, cognitive ability, or social support system  Outcome: Progressing

## 2025-03-21 NOTE — OP NOTE
OPERATIVE REPORT  PATIENT NAME: Cary Keys    :  1995  MRN: [id number]  Pt Location: [facility] L&D OR ROOM 02    SURGERY DATE: 3/21/2025    Surgeons and Role:     * Acacia Bone,  - Assisting     * Rosita LUI DO- Primary    Pre-op Diagnosis: Shah pregnancy at 39w5d in vertex presentation.  Gestational HTN  Prior c section, desires repeat    Post-op Diagnosis: Same, delivered    Procedure: Repeat low-transverse  section via Pfannenstiel skin incision    Specimen(s):  ID Type Source Tests Collected by Time Destination   1 :  Tissue (Placenta on Hold) OB Only Placenta TISSUE EXAM OB (PLACENTA) ONLY Agnieszka Wallace MD 3/21/2025 1815    A :  Cord Blood Umbilical cord  Agnieszka Wallace MD 3/21/2025 1815    B :  Cord Blood Cord BLOOD GAS, VENOUS, CORD, BLOOD GAS, ARTERIAL, CORD Agnieszka Wallace MD 3/21/2025 1816        Quantitative blood loss: Pending per nursing calculation    Drains:  Urethral Catheter (Active)   Number of days: 0     Anesthesia Type: Spinal    Operative Indications:   Cary Keys is a 29 y.o.  at 39w5d for repeat  section for gestational HTN.  All risks, benefits, and alternatives were discussed with the patient. All questions were answered. The patient agreed to proceed with surgery.    Amanda Group Classification System:  No Multiple pregnancy, No Transverse or oblique lie, No Breech lie, Gestational age is > or =37 weeks, Multiparous, Previous uterine scar +  is AMANDA GROUP 5    Operative Findings:  1. Live infant delivered from vertex position through clear fluid at 1819, weight pending, Apgar scores of  8 and 9 at 1 and 5 minutes, respectively.  No nuchal cord.  2. Intact placenta delivered via fundal massage, 3-vessel cord noted.  3. Normal appearing bilateral fallopian tubes and ovaries.  4. Moderate facial adhesions, no uterine adhesions    Complications: None    Procedure and Technique:    The patient was taken to the  operating room, where she was placed under spinal anesthesia. The patient was then placed in supine position with a leftward tilt. She was prepped and draped in the normal sterile fashion. When anesthesia was found to be adequate, a Pfannenstiel skin incision was made with a scalpel and carried down to the underlying layer of fascia. The fascia was then incised with a scalpel and extended laterally and superiorly with curved Salazar scissors. The superior portion of the fascial incision was then grasped with two Kocher clamps, elevated, and  from the underlying rectus muscles with sharp and blunt dissection. Attention was then turned to the inferior aspect of the fascial incision, which in a similar manner, was grasped with two Kocher clamps, elevated, and  from the underlying rectus muscles using sharp and blunt dissection. The rectus muscles were then  in the midline, and the peritoneum was entered bluntly. The peritoneal incision was then extended superiorly, inferiorly, and laterally using blunt dissection and electrocautery with excellent visualization of the bladder. When adequate exposure had been achieved, the bladder blade was then placed. The vesicouterine peritoneum was identified and tented upwards with smooth pickups. The vesicouterine peritoneum as entered sharply with Metzenbaum scissors and the incision extended laterally and superiorly with the Metzenbaum scissors. A bladder flap was then created digitally. The bladder blade was then replaced.     A low transverse uterine incision was then made with a scalpel. The uterus was entered bluntly and the hysterotomy was extended bluntly in a cephalad-caudad manner. Amniotomy was performed . The infant was then delivered in vertex presentation. After delayed cord clamping for 30 seconds, the cord was clamped and cut, and the infant was handed off to awaiting Neonatologist. Cord blood was collected and the placenta was delivered with  fundal massage noted to be intact with 3-vessel cord.     The uterus was then exteriorized and cleared of all clots and debris. The hysterotomy was then closed with 0-Vicryl suture in a running locked fashion. A second layer of the same suture was used in a imbricating fashion in order to obtain excellent hemostasis. The uterus was then returned to the abdomen, and the gutters were cleared of all clots and debris. The hysterotomy was inspected and noted to be hemostatic. The fascia was then elevated using Kocher clamps and underlying rectus muscles inspected and noted to be hemostatic. The fascia was then closed with 0 Vicryl suture in a running fashion. The deep subcutaneous tissue was then  closed with 0 Vicryl suture in a running fashion. The skin was then closed with 4-0 Vicryl suture in subcuticular fashion.     The patient tolerated the procedure well. Sponge, lap, and needle counts were correct x 3. The patient was taken to the Recovery Room in stable condition.    I was present for the entire procedure.    No qualified surgical resident was available to assist in the case. The assistance of an additional surgeon was critical for completion of the case. The assistant surgeon provided assistance with exposure, hemostasis, delivery of the infant, tissue manipulation, and suturing. The assistant surgeon was present from the start of the procedure until fascial closure. I, the primary surgeon, was present and scrubbed throughout the entirety of the case and performed all key portions of the surgical procedure.      Patient Disposition:  PACU     SIGNATURE: Rosita Goodson DO  DATE: March 21, 2025  TIME: 7:00 PM

## 2025-03-22 PROBLEM — Z98.891 S/P REPEAT LOW TRANSVERSE C-SECTION: Status: ACTIVE | Noted: 2024-09-16

## 2025-03-22 LAB
ALBUMIN SERPL BCG-MCNC: 2.9 G/DL (ref 3.5–5)
ALP SERPL-CCNC: 91 U/L (ref 34–104)
ALT SERPL W P-5'-P-CCNC: 7 U/L (ref 7–52)
ANION GAP SERPL CALCULATED.3IONS-SCNC: 4 MMOL/L (ref 4–13)
AST SERPL W P-5'-P-CCNC: 13 U/L (ref 13–39)
BILIRUB SERPL-MCNC: 0.77 MG/DL (ref 0.2–1)
BUN SERPL-MCNC: 11 MG/DL (ref 5–25)
CALCIUM ALBUM COR SERPL-MCNC: 9.3 MG/DL (ref 8.3–10.1)
CALCIUM SERPL-MCNC: 8.4 MG/DL (ref 8.4–10.2)
CHLORIDE SERPL-SCNC: 106 MMOL/L (ref 96–108)
CO2 SERPL-SCNC: 25 MMOL/L (ref 21–32)
CREAT SERPL-MCNC: 0.4 MG/DL (ref 0.6–1.3)
ERYTHROCYTE [DISTWIDTH] IN BLOOD BY AUTOMATED COUNT: 12.8 % (ref 11.6–15.1)
GFR SERPL CREATININE-BSD FRML MDRD: 139 ML/MIN/1.73SQ M
GLUCOSE SERPL-MCNC: 82 MG/DL (ref 65–140)
HCT VFR BLD AUTO: 39.5 % (ref 34.8–46.1)
HGB BLD-MCNC: 13.2 G/DL (ref 11.5–15.4)
MCH RBC QN AUTO: 30.7 PG (ref 26.8–34.3)
MCHC RBC AUTO-ENTMCNC: 33.4 G/DL (ref 31.4–37.4)
MCV RBC AUTO: 92 FL (ref 82–98)
PLATELET # BLD AUTO: 193 THOUSANDS/UL (ref 149–390)
PMV BLD AUTO: 9.9 FL (ref 8.9–12.7)
POTASSIUM SERPL-SCNC: 4.2 MMOL/L (ref 3.5–5.3)
PROT SERPL-MCNC: 5.1 G/DL (ref 6.4–8.4)
RBC # BLD AUTO: 4.3 MILLION/UL (ref 3.81–5.12)
SODIUM SERPL-SCNC: 135 MMOL/L (ref 135–147)
TREPONEMA PALLIDUM IGG+IGM AB [PRESENCE] IN SERUM OR PLASMA BY IMMUNOASSAY: NORMAL
WBC # BLD AUTO: 16.59 THOUSAND/UL (ref 4.31–10.16)

## 2025-03-22 PROCEDURE — 99024 POSTOP FOLLOW-UP VISIT: CPT | Performed by: OBSTETRICS & GYNECOLOGY

## 2025-03-22 PROCEDURE — 85027 COMPLETE CBC AUTOMATED: CPT | Performed by: OBSTETRICS & GYNECOLOGY

## 2025-03-22 PROCEDURE — 80053 COMPREHEN METABOLIC PANEL: CPT | Performed by: STUDENT IN AN ORGANIZED HEALTH CARE EDUCATION/TRAINING PROGRAM

## 2025-03-22 RX ORDER — KETOROLAC TROMETHAMINE 30 MG/ML
30 INJECTION, SOLUTION INTRAMUSCULAR; INTRAVENOUS EVERY 6 HOURS PRN
Status: DISCONTINUED | OUTPATIENT
Start: 2025-03-22 | End: 2025-03-22

## 2025-03-22 RX ORDER — ONDANSETRON 2 MG/ML
4 INJECTION INTRAMUSCULAR; INTRAVENOUS EVERY 8 HOURS PRN
Status: DISCONTINUED | OUTPATIENT
Start: 2025-03-22 | End: 2025-03-23 | Stop reason: HOSPADM

## 2025-03-22 RX ORDER — IBUPROFEN 600 MG/1
600 TABLET, FILM COATED ORAL EVERY 6 HOURS PRN
Status: DISCONTINUED | OUTPATIENT
Start: 2025-03-22 | End: 2025-03-23 | Stop reason: HOSPADM

## 2025-03-22 RX ORDER — IBUPROFEN 600 MG/1
600 TABLET, FILM COATED ORAL EVERY 6 HOURS
Status: DISCONTINUED | OUTPATIENT
Start: 2025-03-25 | End: 2025-03-22

## 2025-03-22 RX ADMIN — METOCLOPRAMIDE 10 MG: 5 INJECTION, SOLUTION INTRAMUSCULAR; INTRAVENOUS at 03:06

## 2025-03-22 RX ADMIN — DIPHENHYDRAMINE HYDROCHLORIDE 25 MG: 25 TABLET ORAL at 11:47

## 2025-03-22 RX ADMIN — SODIUM CHLORIDE, SODIUM LACTATE, POTASSIUM CHLORIDE, AND CALCIUM CHLORIDE 500 ML: .6; .31; .03; .02 INJECTION, SOLUTION INTRAVENOUS at 07:48

## 2025-03-22 RX ADMIN — SERTRALINE 25 MG: 25 TABLET, FILM COATED ORAL at 10:42

## 2025-03-22 RX ADMIN — ACETAMINOPHEN 650 MG: 325 TABLET, FILM COATED ORAL at 17:21

## 2025-03-22 RX ADMIN — DOCUSATE SODIUM 100 MG: 100 CAPSULE, LIQUID FILLED ORAL at 10:42

## 2025-03-22 RX ADMIN — DOCUSATE SODIUM 100 MG: 100 CAPSULE, LIQUID FILLED ORAL at 17:21

## 2025-03-22 RX ADMIN — KETOROLAC TROMETHAMINE 30 MG: 30 INJECTION, SOLUTION INTRAMUSCULAR; INTRAVENOUS at 07:37

## 2025-03-22 RX ADMIN — ONDANSETRON 4 MG: 2 INJECTION, SOLUTION INTRAMUSCULAR; INTRAVENOUS at 09:36

## 2025-03-22 RX ADMIN — LORATADINE 10 MG: 10 TABLET ORAL at 10:42

## 2025-03-22 RX ADMIN — ACETAMINOPHEN 650 MG: 325 TABLET, FILM COATED ORAL at 03:05

## 2025-03-22 RX ADMIN — KETOROLAC TROMETHAMINE 30 MG: 30 INJECTION, SOLUTION INTRAMUSCULAR; INTRAVENOUS at 01:01

## 2025-03-22 RX ADMIN — SODIUM CHLORIDE, SODIUM LACTATE, POTASSIUM CHLORIDE, AND CALCIUM CHLORIDE 500 ML: .6; .31; .03; .02 INJECTION, SOLUTION INTRAVENOUS at 10:40

## 2025-03-22 RX ADMIN — IBUPROFEN 600 MG: 600 TABLET, FILM COATED ORAL at 20:19

## 2025-03-22 RX ADMIN — IBUPROFEN 600 MG: 600 TABLET, FILM COATED ORAL at 13:17

## 2025-03-22 RX ADMIN — ACETAMINOPHEN 650 MG: 325 TABLET, FILM COATED ORAL at 23:32

## 2025-03-22 RX ADMIN — SODIUM CHLORIDE, SODIUM LACTATE, POTASSIUM CHLORIDE, AND CALCIUM CHLORIDE 1000 ML: .6; .31; .03; .02 INJECTION, SOLUTION INTRAVENOUS at 13:30

## 2025-03-22 RX ADMIN — ACETAMINOPHEN 650 MG: 325 TABLET, FILM COATED ORAL at 10:41

## 2025-03-22 NOTE — ASSESSMENT & PLAN NOTE
Recovering appropriately thus far.   Has been very nauseated overnight with emesis - will give IV fluid bolus this morning.   Will transition to PO meds as the IV meds have been worsening nausea.   Pelletier to be removed today once urine output improved.

## 2025-03-22 NOTE — PLAN OF CARE
Problem: PAIN - ADULT  Goal: Verbalizes/displays adequate comfort level or baseline comfort level  Description: Interventions:- Encourage patient to monitor pain and request assistance- Assess pain using appropriate pain scale- Administer analgesics based on type and severity of pain and evaluate response- Implement non-pharmacological measures as appropriate and evaluate response- Consider cultural and social influences on pain and pain management- Notify physician/advanced practitioner if interventions unsuccessful or patient reports new pain  Outcome: Progressing     Problem: INFECTION - ADULT  Goal: Absence or prevention of progression during hospitalization  Description: INTERVENTIONS:- Assess and monitor for signs and symptoms of infection- Monitor lab/diagnostic results- Monitor all insertion sites, i.e. indwelling lines, tubes, and drains- Monitor endotracheal if appropriate and nasal secretions for changes in amount and color- [place] appropriate cooling/warming therapies per order- Administer medications as ordered- Instruct and encourage patient and family to use good hand hygiene technique- Identify and instruct in appropriate isolation precautions for identified infection/condition  Outcome: Progressing  Goal: Absence of fever/infection during neutropenic period  Description: INTERVENTIONS:- Monitor WBC  Outcome: Progressing     Problem: SAFETY ADULT  Goal: Patient will remain free of falls  Description: INTERVENTIONS:- Educate patient/family on patient safety including physical limitations- Instruct patient to call for assistance with activity - Consult OT/PT to assist with strengthening/mobility - Keep Call bell within reach- Keep bed low and locked with side rails adjusted as appropriate- Keep care items and personal belongings within reach- Initiate and maintain comfort rounds- Make Fall Risk Sign visible to staff-   Outcome: Progressing  Goal: Maintain or return to baseline ADL  function  Description: INTERVENTIONS:-  Assess patient's ability to carry out ADLs; assess patient's baseline for ADL function and identify physical deficits which impact ability to perform ADLs (bathing, care of mouth/teeth, toileting, grooming, dressing, etc.)- Assess/evaluate cause of self-care deficits - Assess range of motion- Assess patient's mobility; develop plan if impaired- Assess patient's need for assistive devices and provide as appropriate- Encourage maximum independence but intervene and supervise when necessary- Involve family in performance of ADLs- Assess for home care needs following discharge - Consider OT consult to assist with ADL evaluation and planning for discharge- Provide patient education as appropriate  Outcome: Progressing  Goal: Maintains/Returns to pre admission functional level  Description: INTERVENTIONS:- Perform AM-PAC 6 Click Basic Mobility/ Daily Activity assessment daily.- Set and communicate daily mobility goal to care team and patient/family/caregiver. - Collaborate with rehabilitation services on mobility goals if consulted-  Outcome: Progressing     Problem: Knowledge Deficit  Goal: Patient/family/caregiver demonstrates understanding of disease process, treatment plan, medications, and discharge instructions  Description: Complete learning assessment and assess knowledge base.Interventions:- Provide teaching at level of understanding- Provide teaching via preferred learning methods  Outcome: Progressing     Problem: DISCHARGE PLANNING  Goal: Discharge to home or other facility with appropriate resources  Description: INTERVENTIONS:- Identify barriers to discharge w/patient and caregiver- Arrange for needed discharge resources and transportation as appropriate- Identify discharge learning needs (meds, wound care, etc.)- Arrange for interpretive services to assist at discharge as needed- Refer to Case Management Department for coordinating discharge planning if the patient  needs post-hospital services based on physician/advanced practitioner order or complex needs related to functional status, cognitive ability, or social support system  Outcome: Progressing

## 2025-03-22 NOTE — PROGRESS NOTES
Progress Note - OB/GYN   Name: Cary Keys 29 y.o. female I MRN: [id number]  Unit/Bed#: -01 I Date of Admission: 3/21/2025   Date of Service: 3/22/2025 I Hospital Day: 1    Assessment & Plan  S/P repeat low transverse   Recovering appropriately thus far.   Has been very nauseated overnight with emesis - will give IV fluid bolus this morning.   Will transition to PO meds as the IV meds have been worsening nausea.   Sutton to be removed today once urine output improved.   Gestational hypertension, third trimester  Currently normotensive.   Labs stable.     OB Post-Partum Progress Note  Subjective   Post delivery. Patient is doing well. Lochia WNL. Pain well controlled. Had many episodes of emesis overnight.        Pain: yes, cramping, improved with meds  Tolerating PO: yes  Voiding: sutton in situ  Flatus: no  BM: no  Ambulating: yes  Breastfeeding:  no  Chest pain: no  Shortness of breath: no  Leg pain: no  Lochia: WNL    Objective :  Temp:  [97.4 °F (36.3 °C)-98.8 °F (37.1 °C)] 97.8 °F (36.6 °C)  HR:  [50-90] 52  BP: ()/(54-90) 114/67  Resp:  [16-18] 16  SpO2:  [97 %-99 %] 98 %  O2 Device: None (Room air)    Physical Exam  Vitals reviewed.   Constitutional:       General: She is not in acute distress.     Appearance: Normal appearance. She is not ill-appearing.   Pulmonary:      Effort: No respiratory distress.   Abdominal:      Palpations: Abdomen is soft.      Tenderness: There is no guarding.      Comments: Incision C/D/I with glue  Fundus firm U-1   Musculoskeletal:         General: No tenderness.   Skin:     General: Skin is warm and dry.   Neurological:      General: No focal deficit present.      Mental Status: She is alert and oriented to person, place, and time.   Psychiatric:         Mood and Affect: Mood normal.         Behavior: Behavior normal.         Lab Results: I have reviewed the following results:  Lab Results   Component Value Date    WBC 16.59 (H) 2025    HGB  13.2 03/22/2025    HCT 39.5 03/22/2025    MCV 92 03/22/2025     03/22/2025     Lab Results   Component Value Date    SODIUM 135 03/22/2025    K 4.2 03/22/2025     03/22/2025    CO2 25 03/22/2025    AGAP 4 03/22/2025    BUN 11 03/22/2025    CREATININE 0.40 (L) 03/22/2025    GLUC 82 03/22/2025    GLUF 73 12/23/2024    CALCIUM 8.4 03/22/2025    AST 13 03/22/2025    ALT 7 03/22/2025    ALKPHOS 91 03/22/2025    TP 5.1 (L) 03/22/2025    TBILI 0.77 03/22/2025    EGFR 139 03/22/2025   \    Intake/Output Summary (Last 24 hours) at 3/22/2025 1051  Last data filed at 3/22/2025 1046  Gross per 24 hour   Intake 1000 ml   Output 1809 ml   Net -809 ml

## 2025-03-22 NOTE — PLAN OF CARE
Problem: PAIN - ADULT  Goal: Verbalizes/displays adequate comfort level or baseline comfort level  Description: Interventions:- Encourage patient to monitor pain and request assistance- Assess pain using appropriate pain scale- Administer analgesics based on type and severity of pain and evaluate response- Implement non-pharmacological measures as appropriate and evaluate response- Consider cultural and social influences on pain and pain management- Notify physician/advanced practitioner if interventions unsuccessful or patient reports new pain  Outcome: Progressing     Problem: INFECTION - ADULT  Goal: Absence or prevention of progression during hospitalization  Description: INTERVENTIONS:- Assess and monitor for signs and symptoms of infection- Monitor lab/diagnostic results- Monitor all insertion sites, i.e. indwelling lines, tubes, and drains- Monitor endotracheal if appropriate and nasal secretions for changes in amount and color- [place] appropriate cooling/warming therapies per order- Administer medications as ordered- Instruct and encourage patient and family to use good hand hygiene technique- Identify and instruct in appropriate isolation precautions for identified infection/condition  Outcome: Progressing  Goal: Absence of fever/infection during neutropenic period  Description: INTERVENTIONS:- Monitor WBC  Outcome: Progressing     Problem: SAFETY ADULT  Goal: Patient will remain free of falls  Description: INTERVENTIONS:- Educate patient/family on patient safety including physical limitations- Instruct patient to call for assistance with activity - Consult OT/PT to assist with strengthening/mobility - Keep Call bell within reach- Keep bed low and locked with side rails adjusted as appropriate- Keep care items and personal belongings within reach- Initiate and maintain comfort rounds- Make Fall Risk Sign visible to staff-   Outcome: Progressing  Goal: Maintain or return to baseline ADL  function  Description: INTERVENTIONS:-  Assess patient's ability to carry out ADLs; assess patient's baseline for ADL function and identify physical deficits which impact ability to perform ADLs (bathing, care of mouth/teeth, toileting, grooming, dressing, etc.)- Assess/evaluate cause of self-care deficits - Assess range of motion- Assess patient's mobility; develop plan if impaired- Assess patient's need for assistive devices and provide as appropriate- Encourage maximum independence but intervene and supervise when necessary- Involve family in performance of ADLs- Assess for home care needs following discharge - Consider OT consult to assist with ADL evaluation and planning for discharge- Provide patient education as appropriate  Outcome: Progressing  Goal: Maintains/Returns to pre admission functional level  Description: INTERVENTIONS:- Perform AM-PAC 6 Click Basic Mobility/ Daily Activity assessment daily.- Set and communicate daily mobility goal to care team and patient/family/caregiver. - Collaborate with rehabilitation services on mobility goals if consulted-  Outcome: Progressing     Problem: Knowledge Deficit  Goal: Patient/family/caregiver demonstrates understanding of disease process, treatment plan, medications, and discharge instructions  Description: Complete learning assessment and assess knowledge base.Interventions:- Provide teaching at level of understanding- Provide teaching via preferred learning methods  Outcome: Progressing     Problem: DISCHARGE PLANNING  Goal: Discharge to home or other facility with appropriate resources  Description: INTERVENTIONS:- Identify barriers to discharge w/patient and caregiver- Arrange for needed discharge resources and transportation as appropriate- Identify discharge learning needs (meds, wound care, etc.)- Arrange for interpretive services to assist at discharge as needed- Refer to Case Management Department for coordinating discharge planning if the patient  needs post-hospital services based on physician/advanced practitioner order or complex needs related to functional status, cognitive ability, or social support system  Outcome: Progressing

## 2025-03-22 NOTE — ANESTHESIA POSTPROCEDURE EVALUATION
Post-Op Assessment Note    CV Status:  Stable    Pain management: adequate       Mental Status:  Alert and awake   Hydration Status:  Euvolemic   PONV Controlled:  Controlled   Airway Patency:  Patent     Post Op Vitals Reviewed: Yes    No anethesia notable event occurred.    Staff: Anesthesiologist           Last Filed PACU Vitals:  Vitals Value Taken Time   Temp 97.7 °F (36.5 °C) 03/21/25 1922   Pulse 66 03/21/25 1922   /61 03/21/25 1922   Resp 18 03/21/25 1922   SpO2         Modified Jennifer:     Vitals Value Taken Time   Activity 2 03/21/25 1910   Respiration 2 03/21/25 1910   Circulation 2 03/21/25 1910   Consciousness 2 03/21/25 1910   Oxygen Saturation 2 03/21/25 1910     Modified Jennifer Score: 10

## 2025-03-23 VITALS
HEIGHT: 63 IN | DIASTOLIC BLOOD PRESSURE: 80 MMHG | RESPIRATION RATE: 18 BRPM | HEART RATE: 72 BPM | SYSTOLIC BLOOD PRESSURE: 123 MMHG | TEMPERATURE: 98.1 F | OXYGEN SATURATION: 100 % | WEIGHT: 177 LBS | BODY MASS INDEX: 31.36 KG/M2

## 2025-03-23 PROCEDURE — NC001 PR NO CHARGE: Performed by: OBSTETRICS & GYNECOLOGY

## 2025-03-23 PROCEDURE — 99024 POSTOP FOLLOW-UP VISIT: CPT | Performed by: OBSTETRICS & GYNECOLOGY

## 2025-03-23 RX ORDER — OXYCODONE HYDROCHLORIDE 5 MG/1
5 TABLET ORAL EVERY 4 HOURS PRN
Qty: 6 TABLET | Refills: 0 | Status: SHIPPED | OUTPATIENT
Start: 2025-03-23 | End: 2025-04-02

## 2025-03-23 RX ORDER — IBUPROFEN 600 MG/1
600 TABLET, FILM COATED ORAL EVERY 6 HOURS PRN
Qty: 30 TABLET | Refills: 0 | Status: SHIPPED | OUTPATIENT
Start: 2025-03-23

## 2025-03-23 RX ORDER — DOCUSATE SODIUM 100 MG/1
100 CAPSULE, LIQUID FILLED ORAL 2 TIMES DAILY PRN
Qty: 60 CAPSULE | Refills: 0 | Status: SHIPPED | OUTPATIENT
Start: 2025-03-23

## 2025-03-23 RX ADMIN — SERTRALINE 25 MG: 25 TABLET, FILM COATED ORAL at 09:45

## 2025-03-23 RX ADMIN — DOCUSATE SODIUM 100 MG: 100 CAPSULE, LIQUID FILLED ORAL at 09:45

## 2025-03-23 RX ADMIN — ACETAMINOPHEN 650 MG: 325 TABLET, FILM COATED ORAL at 09:45

## 2025-03-23 RX ADMIN — IBUPROFEN 600 MG: 600 TABLET, FILM COATED ORAL at 06:33

## 2025-03-23 RX ADMIN — LORATADINE 10 MG: 10 TABLET ORAL at 09:59

## 2025-03-23 RX ADMIN — IBUPROFEN 600 MG: 600 TABLET, FILM COATED ORAL at 11:43

## 2025-03-23 NOTE — DISCHARGE SUMMARY
Discharge Summary - OB/GYN   Name: Cary Keys 29 y.o. female I MRN: [id number]  Unit/Bed#: -01 I Date of Admission: 3/21/2025   Date of Service: 3/23/2025 I Hospital Day: 2    ADMISSION  Patient of: [facility]  Admission Date: 3/21/2025   Admitting Attending: Dr. Bone  Admitting Diagnoses:   Patient Active Problem List   Diagnosis    S/P repeat low transverse     Short interval between pregnancies affecting pregnancy, antepartum    Elevated blood pressure affecting pregnancy in third trimester, antepartum    Gestational hypertension, third trimester       DELIVERY  Delivery Method: , Low Transverse   Delivery Date and Time: 3/21/2025 6:19 PM  Delivery Attending: Rosita Goodson    DISCHARGE  Discharge Date: 3/23/25  Discharge Attending: Dr. Olvera  Discharge Diagnosis:   Same, Delivered    Clinical course: Admission to Delivery  Cary Keys is a 29 y.o.  who was admitted at 39w5d for scheduled repeat  section..    Delivery  Route of Delivery: , Low Transverse  Reason for  delivery: Prior  1    Anesthesia: Spinal  Delivery: , Low Transverse at 3/21/2025 6:19 PM    Baby's Weight: 3300 g (7 lb 4.4 oz); 116.4    Apgar scores: 8  and 9  at 1 and 5 minutes, respectively    Clinical Course: Post-Delivery:  The post delivery course was unremarkable.    On the day of discharge, the patient was ambulating, voiding spontaneously, tolerating oral intake, and hemodynamically stable. She was able to reasonably perform all ADLs. She had appropriate bowel function. Pain was well-controlled. She was discharged home on postpartum/postop day #2 without complications. Patient was instructed to follow up with her OB as an outpatient and was given appropriate warnings to call her provider with problems or concerns.    Pertinent lab findings included:   Blood type A+.     Last three Hgb values:  Lab Results   Component Value  Date    HGB 13.2 2025    HGB 15.1 2025    HGB 14.3 2025        Problem-specific follow-up plans included the following:  Assessment & Plan  S/P repeat low transverse   A/P.  29 y.o.  POD#2 s/p , Low Transverse at 39w5d of 3300 g (7 lb 4.4 oz) female , apgars 8 /9 .  (1) POD#2.  Delivered 3/21/2025  6:19 PM.  Doing well.  Would very much like to go home.  --> Discharge home.  Gestational hypertension, third trimester  Normal BP the past 24 hours.  Laboratory studies without evidence of end-organ damage.  No symptoms of severe preeclampsia.  --> For home BP monitoring, early followup.      Discharge med list:   Medication List      START taking these medications     docusate sodium 100 mg capsule; Commonly known as: COLACE; Take 1   capsule (100 mg total) by mouth 2 (two) times a day as needed for   constipation   ibuprofen 600 mg tablet; Commonly known as: MOTRIN; Take 1 tablet (600   mg total) by mouth every 6 (six) hours as needed for moderate pain   oxyCODONE 5 immediate release tablet; Commonly known as: Roxicodone;   Take 1 tablet (5 mg total) by mouth every 4 (four) hours as needed for   moderate pain for up to 10 days Max Daily Amount: 30 mg     CONTINUE taking these medications     diphenhydrAMINE 25 mg tablet; Commonly known as: BENADRYL   loratadine 10 mg tablet; Commonly known as: CLARITIN   magnesium 30 MG tablet   PRENATAL+DHA PO   sertraline 25 mg tablet; Commonly known as: ZOLOFT; TAKE 1 TABLET (25 MG   TOTAL) BY MOUTH DAILY.       Condition at discharge:   good     Disposition:   Home    Planned Readmission:   No    Discharge Statement:  I have spent a total time of 45 minutes in caring for this patient on the day of the visit/encounter. >30 minutes of time was spent on: Risks and benefits of tx options, Instructions for management, Patient and family education, Impressions, Counseling / Coordination of care, Documenting in the medical record, and  Reviewing / ordering tests, medicine, procedures  .

## 2025-03-23 NOTE — ASSESSMENT & PLAN NOTE
Normal BP the past 24 hours.  Laboratory studies without evidence of end-organ damage.  No symptoms of severe preeclampsia.  --> For home BP monitoring, early followup.

## 2025-03-23 NOTE — PLAN OF CARE
Problem: PAIN - ADULT  Goal: Verbalizes/displays adequate comfort level or baseline comfort level  Description: Interventions:- Encourage patient to monitor pain and request assistance- Assess pain using appropriate pain scale- Administer analgesics based on type and severity of pain and evaluate response- Implement non-pharmacological measures as appropriate and evaluate response- Consider cultural and social influences on pain and pain management- Notify physician/advanced practitioner if interventions unsuccessful or patient reports new pain  Outcome: Completed     Problem: INFECTION - ADULT  Goal: Absence or prevention of progression during hospitalization  Description: INTERVENTIONS:- Assess and monitor for signs and symptoms of infection- Monitor lab/diagnostic results- Monitor all insertion sites, i.e. indwelling lines, tubes, and drains- Monitor endotracheal if appropriate and nasal secretions for changes in amount and color- [place] appropriate cooling/warming therapies per order- Administer medications as ordered- Instruct and encourage patient and family to use good hand hygiene technique- Identify and instruct in appropriate isolation precautions for identified infection/condition  Outcome: Completed  Goal: Absence of fever/infection during neutropenic period  Description: INTERVENTIONS:- Monitor WBC  Outcome: Completed     Problem: SAFETY ADULT  Goal: Patient will remain free of falls  Description: INTERVENTIONS:- Educate patient/family on patient safety including physical limitations- Instruct patient to call for assistance with activity - Consult OT/PT to assist with strengthening/mobility - Keep Call bell within reach- Keep bed low and locked with side rails adjusted as appropriate- Keep care items and personal belongings within reach- Initiate and maintain comfort rounds- Make Fall Risk Sign visible to staff-   Outcome: Completed  Goal: Maintain or return to baseline ADL function  Description:  INTERVENTIONS:-  Assess patient's ability to carry out ADLs; assess patient's baseline for ADL function and identify physical deficits which impact ability to perform ADLs (bathing, care of mouth/teeth, toileting, grooming, dressing, etc.)- Assess/evaluate cause of self-care deficits - Assess range of motion- Assess patient's mobility; develop plan if impaired- Assess patient's need for assistive devices and provide as appropriate- Encourage maximum independence but intervene and supervise when necessary- Involve family in performance of ADLs- Assess for home care needs following discharge - Consider OT consult to assist with ADL evaluation and planning for discharge- Provide patient education as appropriate  Outcome: Completed  Goal: Maintains/Returns to pre admission functional level  Description: INTERVENTIONS:- Perform AM-PAC 6 Click Basic Mobility/ Daily Activity assessment daily.- Set and communicate daily mobility goal to care team and patient/family/caregiver. - Collaborate with rehabilitation services on mobility goals if consulted-  Outcome: Completed     Problem: Knowledge Deficit  Goal: Patient/family/caregiver demonstrates understanding of disease process, treatment plan, medications, and discharge instructions  Description: Complete learning assessment and assess knowledge base.Interventions:- Provide teaching at level of understanding- Provide teaching via preferred learning methods  Outcome: Completed     Problem: DISCHARGE PLANNING  Goal: Discharge to home or other facility with appropriate resources  Description: INTERVENTIONS:- Identify barriers to discharge w/patient and caregiver- Arrange for needed discharge resources and transportation as appropriate- Identify discharge learning needs (meds, wound care, etc.)- Arrange for interpretive services to assist at discharge as needed- Refer to Case Management Department for coordinating discharge planning if the patient needs post-hospital services  based on physician/advanced practitioner order or complex needs related to functional status, cognitive ability, or social support system  Outcome: Completed

## 2025-03-23 NOTE — DISCHARGE INSTR - AVS FIRST PAGE
You should take your blood pressure at home twice daily.     Normal-range blood pressures in the postpartum period are less than 140 for the top number (systolic) AND less than 90 for the bottom number (diastolic).    Mildly elevated blood pressures in the postpartum period are 140-159 systolic OR  diastolic. Some mildly elevated blood pressures are expected due to your diagnosis of gestational hypertension.     If your blood pressures are persistently in the 150s systolic or 100s diastolic, you should call the office, as initiation/titration of blood pressure medication may be necessary.    Severe-range blood pressures in the postpartum period are 160 or greater systolic  or greater diastolic. You should NOT have any severely elevated blood pressures at home.     If your blood pressure is greater than 160/110, you should promptly call the office and proceed to the hospital for evaluation for pre-eclampsia.    Symptoms of preeclampsia include headache that does not go away with Tylenol, changes in vision such as blurred vision or spots in your vision, chest pain, shortness of breath, pain in the upper right side of your abdomen, or sudden onset or worsening of swelling.    If you develop any of the above symptoms, you should promptly call the office and present to the hospital for evaluation of pre-eclampsia.

## 2025-03-23 NOTE — PROGRESS NOTES
"Progress Note - OB/GYN   Name: Cary Keys 29 y.o. female I MRN: [id number]  Unit/Bed#: -01 I Date of Admission: 3/21/2025   Date of Service: 3/23/2025 I Hospital Day: 2     Assessment & Plan  S/P repeat low transverse   A/P.  29 y.o.  POD#2 s/p , Low Transverse at 39w5d of 3300 g (7 lb 4.4 oz) female , apgars 8 /9 .  (1) POD#2.  Delivered 3/21/2025  6:19 PM.  Doing well.  Would very much like to go home.  --> Discharge home.  Gestational hypertension, third trimester  Normal BP the past 24 hours.  Laboratory studies without evidence of end-organ damage.  No symptoms of severe preeclampsia.  --> For home BP monitoring, early followup.    Jeancarlos Olvera MD  25  ----------------------------------------------------------------------------------------------    Subjective/    Feels well.  Tolerating po, ambulating, voiding without difficulty.  Happy.  Bonding.  Pain controlled with oral medications.  (+)flatus.    No HA/VF changes.    Objective/  Blood pressure 114/80, pulse 63, temperature 98.5 °F (36.9 °C), temperature source Oral, resp. rate 18, height 5' 3\" (1.6 m), weight 80.3 kg (177 lb), last menstrual period 2024, SpO2 100%, not currently breastfeeding.    Patient Vitals for the past 24 hrs:   BP Temp Temp src Pulse Resp SpO2   25 0700 114/80 98.5 °F (36.9 °C) Oral 63 18 --   25 0413 112/68 97.8 °F (36.6 °C) Temporal 76 18 100 %   25 2326 103/63 98.2 °F (36.8 °C) Temporal 64 18 97 %   25 1926 101/56 98.4 °F (36.9 °C) Oral 63 18 97 %   25 1500 108/68 97.6 °F (36.4 °C) Skin 68 18 98 %   25 1053 101/59 97.8 °F (36.6 °C) Skin 56 16 98 %         Physical Exam/      General:  Alert, comfortable, NAD      Cardiovascular: Regular rate and rhythm      Respiratory: Clear to auscultation bilaterally.      Abdomen: Soft, non-tender, non-distended.  Incision clean, dry, and intact.      Fundus: Firm, below umbilicus, " nontender      Extremities: Warm, non-tender      Labs/      Blood type:  A+/-      Rubella: Immune      Lab Results   Component Value Date    HGB 13.2 03/22/2025     03/22/2025    BUN 11 03/22/2025    CREATININE 0.40 (L) 03/22/2025    AST 13 03/22/2025    ALT 7 03/22/2025    UTPCR 0.2 (H) 03/21/2025

## 2025-03-23 NOTE — PLAN OF CARE
Problem: PAIN - ADULT  Goal: Verbalizes/displays adequate comfort level or baseline comfort level  Description: Interventions:- Encourage patient to monitor pain and request assistance- Assess pain using appropriate pain scale- Administer analgesics based on type and severity of pain and evaluate response- Implement non-pharmacological measures as appropriate and evaluate response- Consider cultural and social influences on pain and pain management- Notify physician/advanced practitioner if interventions unsuccessful or patient reports new pain  Outcome: Progressing     Problem: INFECTION - ADULT  Goal: Absence or prevention of progression during hospitalization  Description: INTERVENTIONS:- Assess and monitor for signs and symptoms of infection- Monitor lab/diagnostic results- Monitor all insertion sites, i.e. indwelling lines, tubes, and drains- Monitor endotracheal if appropriate and nasal secretions for changes in amount and color- [place] appropriate cooling/warming therapies per order- Administer medications as ordered- Instruct and encourage patient and family to use good hand hygiene technique- Identify and instruct in appropriate isolation precautions for identified infection/condition  Outcome: Progressing  Goal: Absence of fever/infection during neutropenic period  Description: INTERVENTIONS:- Monitor WBC  Outcome: Progressing     Problem: SAFETY ADULT  Goal: Patient will remain free of falls  Description: INTERVENTIONS:- Educate patient/family on patient safety including physical limitations- Instruct patient to call for assistance with activity - Consult OT/PT to assist with strengthening/mobility - Keep Call bell within reach- Keep bed low and locked with side rails adjusted as appropriate- Keep care items and personal belongings within reach- Initiate and maintain comfort rounds- Make Fall Risk Sign visible to staff-   Outcome: Progressing  Goal: Maintain or return to baseline ADL  function  Description: INTERVENTIONS:-  Assess patient's ability to carry out ADLs; assess patient's baseline for ADL function and identify physical deficits which impact ability to perform ADLs (bathing, care of mouth/teeth, toileting, grooming, dressing, etc.)- Assess/evaluate cause of self-care deficits - Assess range of motion- Assess patient's mobility; develop plan if impaired- Assess patient's need for assistive devices and provide as appropriate- Encourage maximum independence but intervene and supervise when necessary- Involve family in performance of ADLs- Assess for home care needs following discharge - Consider OT consult to assist with ADL evaluation and planning for discharge- Provide patient education as appropriate  Outcome: Progressing  Goal: Maintains/Returns to pre admission functional level  Description: INTERVENTIONS:- Perform AM-PAC 6 Click Basic Mobility/ Daily Activity assessment daily.- Set and communicate daily mobility goal to care team and patient/family/caregiver. - Collaborate with rehabilitation services on mobility goals if consulted-  Outcome: Progressing     Problem: Knowledge Deficit  Goal: Patient/family/caregiver demonstrates understanding of disease process, treatment plan, medications, and discharge instructions  Description: Complete learning assessment and assess knowledge base.Interventions:- Provide teaching at level of understanding- Provide teaching via preferred learning methods  Outcome: Progressing     Problem: DISCHARGE PLANNING  Goal: Discharge to home or other facility with appropriate resources  Description: INTERVENTIONS:- Identify barriers to discharge w/patient and caregiver- Arrange for needed discharge resources and transportation as appropriate- Identify discharge learning needs (meds, wound care, etc.)- Arrange for interpretive services to assist at discharge as needed- Refer to Case Management Department for coordinating discharge planning if the patient  needs post-hospital services based on physician/advanced practitioner order or complex needs related to functional status, cognitive ability, or social support system  Outcome: Progressing

## 2025-03-23 NOTE — PLAN OF CARE
Problem: PAIN - ADULT  Goal: Verbalizes/displays adequate comfort level or baseline comfort level  Description: Interventions:- Encourage patient to monitor pain and request assistance- Assess pain using appropriate pain scale- Administer analgesics based on type and severity of pain and evaluate response- Implement non-pharmacological measures as appropriate and evaluate response- Consider cultural and social influences on pain and pain management- Notify physician/advanced practitioner if interventions unsuccessful or patient reports new pain  3/22/2025 2322 by Leida Phelan RN  Outcome: Progressing  3/22/2025 2322 by Leida Phelan RN  Outcome: Progressing     Problem: INFECTION - ADULT  Goal: Absence or prevention of progression during hospitalization  Description: INTERVENTIONS:- Assess and monitor for signs and symptoms of infection- Monitor lab/diagnostic results- Monitor all insertion sites, i.e. indwelling lines, tubes, and drains- Monitor endotracheal if appropriate and nasal secretions for changes in amount and color- [place] appropriate cooling/warming therapies per order- Administer medications as ordered- Instruct and encourage patient and family to use good hand hygiene technique- Identify and instruct in appropriate isolation precautions for identified infection/condition  3/22/2025 2322 by Leida Phelan RN  Outcome: Progressing  3/22/2025 2322 by Leida Phelan RN  Outcome: Progressing  Goal: Absence of fever/infection during neutropenic period  Description: INTERVENTIONS:- Monitor WBC  3/22/2025 2322 by Leida Phelan RN  Outcome: Progressing  3/22/2025 2322 by Leida Phelan RN  Outcome: Progressing     Problem: SAFETY ADULT  Goal: Patient will remain free of falls  Description: INTERVENTIONS:- Educate patient/family on patient safety including physical limitations- Instruct patient to call for assistance with activity - Consult OT/PT to assist with strengthening/mobility - Keep  Call bell within reach- Keep bed low and locked with side rails adjusted as appropriate- Keep care items and personal belongings within reach- Initiate and maintain comfort rounds- Make Fall Risk Sign visible to staff-   3/22/2025 2322 by Leida Phelan RN  Outcome: Progressing  3/22/2025 2322 by Leida Phelan RN  Outcome: Progressing  Goal: Maintain or return to baseline ADL function  Description: INTERVENTIONS:-  Assess patient's ability to carry out ADLs; assess patient's baseline for ADL function and identify physical deficits which impact ability to perform ADLs (bathing, care of mouth/teeth, toileting, grooming, dressing, etc.)- Assess/evaluate cause of self-care deficits - Assess range of motion- Assess patient's mobility; develop plan if impaired- Assess patient's need for assistive devices and provide as appropriate- Encourage maximum independence but intervene and supervise when necessary- Involve family in performance of ADLs- Assess for home care needs following discharge - Consider OT consult to assist with ADL evaluation and planning for discharge- Provide patient education as appropriate  3/22/2025 2322 by Leida Phelan RN  Outcome: Progressing  3/22/2025 2322 by Leida Phelan RN  Outcome: Progressing  Goal: Maintains/Returns to pre admission functional level  Description: INTERVENTIONS:- Perform AM-PAC 6 Click Basic Mobility/ Daily Activity assessment daily.- Set and communicate daily mobility goal to care team and patient/family/caregiver. - Collaborate with rehabilitation services on mobility goals if consulted-  3/22/2025 2322 by Leida Phelan RN  Outcome: Progressing  3/22/2025 2322 by Leida Phelan RN  Outcome: Progressing     Problem: Knowledge Deficit  Goal: Patient/family/caregiver demonstrates understanding of disease process, treatment plan, medications, and discharge instructions  Description: Complete learning assessment and assess knowledge base.Interventions:- Provide  teaching at level of understanding- Provide teaching via preferred learning methods  3/22/2025 2322 by Leida Phelan RN  Outcome: Progressing  3/22/2025 2322 by Leida Phelan RN  Outcome: Progressing     Problem: DISCHARGE PLANNING  Goal: Discharge to home or other facility with appropriate resources  Description: INTERVENTIONS:- Identify barriers to discharge w/patient and caregiver- Arrange for needed discharge resources and transportation as appropriate- Identify discharge learning needs (meds, wound care, etc.)- Arrange for interpretive services to assist at discharge as needed- Refer to Case Management Department for coordinating discharge planning if the patient needs post-hospital services based on physician/advanced practitioner order or complex needs related to functional status, cognitive ability, or social support system  3/22/2025 2322 by Leida Phelan RN  Outcome: Progressing  3/22/2025 2322 by Leida Phelan, RN  Outcome: Progressing

## 2025-03-23 NOTE — ASSESSMENT & PLAN NOTE
A/P.  29 y.o.  POD#2 s/p , Low Transverse at 39w5d of 3300 g (7 lb 4.4 oz) female , apgars 8 /9 .  (1) POD#2.  Delivered 3/21/2025  6:19 PM.  Doing well.  Would very much like to go home.  --> Discharge home.

## 2025-03-24 NOTE — UTILIZATION REVIEW
"    MOTHER AND BABY DISCHARGED     NOTIFICATION OF INPATIENT ADMISSION   MATERNITY/DELIVERY AUTHORIZATION REQUEST   SERVICING FACILITY:   [facility] - L&D, , NICU  [address] [place] [address]  Tax ID: [id number]  NPI: [id number] ATTENDING PROVIDER:  Attending Name and NPI#: Rosita LUI Do [[id number]]  Address: [address] [place] [address]  Phone: [phone]     ADMISSION INFORMATION:  Place of Service: Inpatient [facility]  Place of Service Code: 21  Inpatient Admission Date/Time: 3/21/25  3:34 PM  Discharge Date/Time: 3/23/2025 12:00 PM  Admitting Diagnosis Code/Description:  No admission diagnoses are documented for this encounter.     Mother: Cary Keys 1995 Estimated Date of Delivery: 3/23/25  Delivering clinician: Rosita Goodson   OB History          3    Para   2    Term   2       0    AB   1    Living   2         SAB   0    IAB   1    Ectopic   0    Multiple   0    Live Births   2               [place] Name & MRN:   Information for the patient's :  Massimo, Baby Girl (Cary) [[id number]]    Delivery Information:  Sex: female  Delivered 3/21/2025 6:19 PM by , Low Transverse; Gestational Age: 39w5d     Measurements:  Weight: 7 lb 4.4 oz (3300 g);  Height: 20\"    APGAR 1 minute 5 minutes 10 minutes   Totals: 8 9       UTILIZATION REVIEW CONTACT:  Juanita Lieberman, Utilization   Network Utilization Review Department  Phone: [phone]  Fax [phone]  Email: [email or web address]  Contact for approvals/pending authorizations, clinical reviews, and discharge.     PHYSICIAN ADVISORY SERVICES:  Medical Necessity Denial & Ptve-id-Crdc Review  Phone: [phone]  Fax: [phone]  Email: [email or web address]     DISCHARGE SUPPORT TEAM:  For Patients Discharge Needs & Updates  Phone: [phone] opt. 2 Fax: " [phone]  Email: [email or web address]     NOTIFICATION OF ADMISSION DISCHARGE   This is a Notification of Discharge from [facility]. Please be advised that this patient has been discharge from our facility. Below you will find the admission and discharge date and time including the patient’s disposition.   UTILIZATION REVIEW CONTACT:  Renetta Lieberman  Utilization   Network Utilization Review Department  Phone: [phone] x carefully listen to the prompts. All voicemails are confidential.  Email: [email or web address]     ADMISSION INFORMATION  PRESENTATION DATE: 3/21/2025  2:54 PM  OBERVATION ADMISSION DATE: N/A  INPATIENT ADMISSION DATE: 3/21/25  3:34 PM   DISCHARGE DATE: 3/23/2025 12:00 PM   DISPOSITION:Home/Self Care    Network Utilization Review Department  ATTENTION: Please call with any questions or concerns to [phone] and carefully listen to the prompts so that you are directed to the right person. All voicemails are confidential.   For Discharge needs, contact Care Management DC Support Team at [phone] opt. 2  Send all requests for admission clinical reviews, approved or denied determinations and any other requests to dedicated fax number below belonging to the campus where the patient is receiving treatment. List of dedicated fax numbers for the Facilities:  FACILITY NAME UR FAX NUMBER   ADMISSION DENIALS (Administrative/Medical Necessity) [phone]   DISCHARGE SUPPORT TEAM ([facility]) [phone]   PARENT CHILD HEALTH (Maternity/NICU/Pediatrics) [phone]   [facility] [phone]   [facility] [phone]   [facility] [phone]   [facility] [phone]   [facility] [phone]   [facility] [phone]   [facility] [phone]   [place]  [facility] [phone]   [facility] [phone]   [facility] [phone]   [facility] [phone]   [facility] [phone]

## 2025-03-27 ENCOUNTER — TELEPHONE (OUTPATIENT)
Dept: OBGYN CLINIC | Facility: CLINIC | Age: 30
End: 2025-03-27

## 2025-03-27 NOTE — TELEPHONE ENCOUNTER
Attempted to contact patient after delivery of baby. Pt delivered 3/21/25. MyChart msg sent 3/26/25.  Pt unavailable.   Left message for pt to reach out via [employer] w/questions/concerns. PPV scheduled 4/16/25 w/Dr Wallace

## 2025-03-28 ENCOUNTER — TELEPHONE (OUTPATIENT)
Age: 30
End: 2025-03-28

## 2025-03-28 ENCOUNTER — CLINICAL SUPPORT (OUTPATIENT)
Age: 30
End: 2025-03-28

## 2025-03-28 VITALS
SYSTOLIC BLOOD PRESSURE: 122 MMHG | WEIGHT: 165 LBS | BODY MASS INDEX: 29.23 KG/M2 | HEIGHT: 63 IN | DIASTOLIC BLOOD PRESSURE: 82 MMHG

## 2025-03-28 DIAGNOSIS — Z01.30 BLOOD PRESSURE CHECK: ICD-10-CM

## 2025-03-28 PROCEDURE — 88307 TISSUE EXAM BY PATHOLOGIST: CPT | Performed by: STUDENT IN AN ORGANIZED HEALTH CARE EDUCATION/TRAINING PROGRAM

## 2025-03-28 PROCEDURE — NURSE

## 2025-03-28 NOTE — TELEPHONE ENCOUNTER
POSTPARTUM PHONE CALL ASSESSMENT    Date of Delivery: 3/21/25    Delivering Provider: Dr Wallace    Mode:     Delivery Notes/Complications: No complications    How are you doing physically/emotionally? Patient states she is doing well.     Breastfeeding/Formula/Both? Bottle feeding - reviewed avoiding all stimulation, good support bra - verbalized understanding    Do you still have bleeding? yes  If so, how much/how severe? light vaginal bleeding    Do you have any pain? yes  If so, how much/how severe? moderate pain    Regular BMs/Urination? yes    Do you have any other questions or concerns for us or your provider? no     Have you scheduled the pediatrician appointment with pediatrician? yes    Do you have a postpartum visit scheduled? yes  Date scheduled: 25 Provider:  Dr Wallace   Had BP check in office today

## 2025-04-16 ENCOUNTER — POSTPARTUM VISIT (OUTPATIENT)
Age: 30
End: 2025-04-16

## 2025-04-16 VITALS
WEIGHT: 160.4 LBS | DIASTOLIC BLOOD PRESSURE: 66 MMHG | HEIGHT: 63 IN | BODY MASS INDEX: 28.42 KG/M2 | SYSTOLIC BLOOD PRESSURE: 104 MMHG

## 2025-04-16 PROCEDURE — 99024 POSTOP FOLLOW-UP VISIT: CPT | Performed by: OBSTETRICS & GYNECOLOGY

## 2025-04-16 NOTE — PROGRESS NOTES
"Name: Cary Keys      : 1995      MRN: [id number]  Encounter Provider: Agnieszka Roth MD  Encounter Date: 2025   Encounter department: [facility] [place]  :  Assessment & Plan  Postpartum state    Orders:    Ambulatory Referral to Physical Therapy; Future    Postpartum care following  delivery             History of Present Illness   29 y.o. female here for postpartum visit.  She is s/p  (RLTCS) on 3/21/2025 .       Gender: female   Apgars: 8/9  Weight: 7lb 4.4oz  Her lochia has resolved.    She is Bottle feeding without problems.   She denies postpartum blues/depression.  Her EPDS is 5.  Doing well on Zoloft.     Has always used Nuvaring, but would like to discuss other options.   Interested in Nexplanon after discussion, will return for insertion.      Cary Keys is a 29 y.o. female who presents for PP visit.      Review of Systems       Objective   /66   Ht 5' 3\" (1.6 m)   Wt 72.8 kg (160 lb 6.4 oz)   LMP 2024 (Exact Date)   Breastfeeding No   BMI 28.41 kg/m²      Physical Exam  Vitals reviewed.   Constitutional:       General: She is not in acute distress.     Appearance: Normal appearance. She is normal weight. She is not ill-appearing.   Abdominal:      Comments: Incision C/D/I  Some remaining skin glue     Skin:     General: Skin is warm and dry.   Neurological:      Mental Status: She is alert and oriented to person, place, and time.   Psychiatric:         Mood and Affect: Mood normal.         Behavior: Behavior normal.           "

## 2025-04-22 ENCOUNTER — PROCEDURE VISIT (OUTPATIENT)
Age: 30
End: 2025-04-22
Payer: COMMERCIAL

## 2025-04-22 VITALS
HEIGHT: 63 IN | BODY MASS INDEX: 28.35 KG/M2 | DIASTOLIC BLOOD PRESSURE: 68 MMHG | WEIGHT: 160 LBS | SYSTOLIC BLOOD PRESSURE: 110 MMHG

## 2025-04-22 DIAGNOSIS — Z30.017 NEXPLANON INSERTION: Primary | ICD-10-CM

## 2025-04-22 DIAGNOSIS — Z32.02 NEGATIVE PREGNANCY TEST: ICD-10-CM

## 2025-04-22 LAB — SL AMB POCT URINE HCG: NEGATIVE

## 2025-04-22 PROCEDURE — 81025 URINE PREGNANCY TEST: CPT | Performed by: OBSTETRICS & GYNECOLOGY

## 2025-04-22 PROCEDURE — 11981 INSERTION DRUG DLVR IMPLANT: CPT | Performed by: OBSTETRICS & GYNECOLOGY

## 2025-04-22 NOTE — PROGRESS NOTES
Remove and insert drug implant    Date/Time: 4/22/2025 1:00 PM    Performed by: Agnieszka Wallace MD  Authorized by: Agnieszka Wallace MD    Consent:       Consent obtained:  Written      Consent given by:  Patient      Procedural risks discussed:  Bleeding, possible continued pain, possible conversion to open surgery, failure rate and infection      Patient questions answered:  yes       Patient agrees, verbalizes understanding, and wants to proceed:  yes    Universal Protocol:       Patient states understanding of procedure being performed:  yes       Required blood products, implants, devices, and special equipment available:  yes    Indication:       Indication:  insertion of non-biodegradable drug delivery implant    Pre-procedure:       Prepped with:  povidone-iodine       Local anesthetic:  Lidocaine with epinephrine      The site was cleaned and prepped in a sterile fashion:  yes    Procedure:       Procedure:  Insertion      Left/right:  Left      The patient was position surpine with their arm externally rotated and flexed at the elbow with their hand behind their head. The insertion site was chosen 8-10 cm medial to the medical epicondyle, and 3-5 cm posterior the sulcus between the bicep and tricep.:  Yes       Visualization of implant inside the applicator was obtained:  yes       Preloaded contraceptive capsule trocar was placed subdermally:  yes       Contraceptive capsule was inserted and trocar removed:  yes       Palpation confirms placement by provider and patient:  yes       Site was closed and/or dressed with:  Steri-strips, gauze and ace bandage      Patient tolerated procedure well:  Patient tolerated procedure well       Lot Number:  [id number]      Expiration Date:  2/28/2027

## 2025-06-16 DIAGNOSIS — F41.9 ANXIETY AND DEPRESSION: ICD-10-CM

## 2025-06-16 DIAGNOSIS — F32.A ANXIETY AND DEPRESSION: ICD-10-CM

## 2025-06-16 RX ORDER — SERTRALINE HYDROCHLORIDE 25 MG/1
50 TABLET, FILM COATED ORAL DAILY
Qty: 90 TABLET | Refills: 1 | Status: SHIPPED | OUTPATIENT
Start: 2025-06-16

## 2025-07-25 DIAGNOSIS — F32.A ANXIETY AND DEPRESSION: ICD-10-CM

## 2025-07-25 DIAGNOSIS — F41.9 ANXIETY AND DEPRESSION: ICD-10-CM

## 2025-07-25 RX ORDER — SERTRALINE HYDROCHLORIDE 25 MG/1
50 TABLET, FILM COATED ORAL DAILY
Qty: 180 TABLET | Refills: 1 | Status: SHIPPED | OUTPATIENT
Start: 2025-07-25

## 2025-08-08 ENCOUNTER — TELEPHONE (OUTPATIENT)
Age: 30
End: 2025-08-08